# Patient Record
Sex: FEMALE | Race: WHITE
[De-identification: names, ages, dates, MRNs, and addresses within clinical notes are randomized per-mention and may not be internally consistent; named-entity substitution may affect disease eponyms.]

---

## 2019-08-25 ENCOUNTER — HOSPITAL ENCOUNTER (EMERGENCY)
Dept: HOSPITAL 62 - ER | Age: 49
Discharge: HOME | End: 2019-08-25
Payer: OTHER GOVERNMENT

## 2019-08-25 VITALS — DIASTOLIC BLOOD PRESSURE: 93 MMHG | SYSTOLIC BLOOD PRESSURE: 141 MMHG

## 2019-08-25 DIAGNOSIS — R10.13: ICD-10-CM

## 2019-08-25 DIAGNOSIS — R07.9: Primary | ICD-10-CM

## 2019-08-25 DIAGNOSIS — Z90.49: ICD-10-CM

## 2019-08-25 DIAGNOSIS — F17.200: ICD-10-CM

## 2019-08-25 LAB
ADD MANUAL DIFF: NO
ALBUMIN SERPL-MCNC: 4.3 G/DL (ref 3.5–5)
ALP SERPL-CCNC: 89 U/L (ref 38–126)
ANION GAP SERPL CALC-SCNC: 11 MMOL/L (ref 5–19)
APPEARANCE UR: CLEAR
APTT PPP: YELLOW S
AST SERPL-CCNC: 136 U/L (ref 14–36)
BASOPHILS # BLD AUTO: 0.1 10^3/UL (ref 0–0.2)
BASOPHILS NFR BLD AUTO: 0.7 % (ref 0–2)
BILIRUB DIRECT SERPL-MCNC: 0.3 MG/DL (ref 0–0.4)
BILIRUB SERPL-MCNC: 0.9 MG/DL (ref 0.2–1.3)
BILIRUB UR QL STRIP: NEGATIVE
BUN SERPL-MCNC: 6 MG/DL (ref 7–20)
CALCIUM: 8.7 MG/DL (ref 8.4–10.2)
CHLORIDE SERPL-SCNC: 105 MMOL/L (ref 98–107)
CK MB SERPL-MCNC: 0.53 NG/ML (ref ?–4.55)
CK SERPL-CCNC: 80 U/L (ref 30–135)
CO2 SERPL-SCNC: 23 MMOL/L (ref 22–30)
EOSINOPHIL # BLD AUTO: 0.1 10^3/UL (ref 0–0.6)
EOSINOPHIL NFR BLD AUTO: 1.1 % (ref 0–6)
ERYTHROCYTE [DISTWIDTH] IN BLOOD BY AUTOMATED COUNT: 14 % (ref 11.5–14)
GLUCOSE SERPL-MCNC: 89 MG/DL (ref 75–110)
GLUCOSE UR STRIP-MCNC: NEGATIVE MG/DL
HCT VFR BLD CALC: 43.3 % (ref 36–47)
HGB BLD-MCNC: 15 G/DL (ref 12–15.5)
KETONES UR STRIP-MCNC: 20 MG/DL
LYMPHOCYTES # BLD AUTO: 2.4 10^3/UL (ref 0.5–4.7)
LYMPHOCYTES NFR BLD AUTO: 22.8 % (ref 13–45)
MCH RBC QN AUTO: 32.8 PG (ref 27–33.4)
MCHC RBC AUTO-ENTMCNC: 34.7 G/DL (ref 32–36)
MCV RBC AUTO: 94 FL (ref 80–97)
MONOCYTES # BLD AUTO: 0.9 10^3/UL (ref 0.1–1.4)
MONOCYTES NFR BLD AUTO: 8.7 % (ref 3–13)
NEUTROPHILS # BLD AUTO: 7.2 10^3/UL (ref 1.7–8.2)
NEUTS SEG NFR BLD AUTO: 66.7 % (ref 42–78)
NITRITE UR QL STRIP: NEGATIVE
PH UR STRIP: 5 [PH] (ref 5–9)
PLATELET # BLD: 301 10^3/UL (ref 150–450)
POTASSIUM SERPL-SCNC: 3.9 MMOL/L (ref 3.6–5)
PROT SERPL-MCNC: 7.6 G/DL (ref 6.3–8.2)
PROT UR STRIP-MCNC: NEGATIVE MG/DL
RBC # BLD AUTO: 4.58 10^6/UL (ref 3.72–5.28)
SP GR UR STRIP: 1.02
TOTAL CELLS COUNTED % (AUTO): 100 %
TROPONIN I SERPL-MCNC: < 0.012 NG/ML
UROBILINOGEN UR-MCNC: NEGATIVE MG/DL (ref ?–2)
WBC # BLD AUTO: 10.8 10^3/UL (ref 4–10.5)

## 2019-08-25 PROCEDURE — 81001 URINALYSIS AUTO W/SCOPE: CPT

## 2019-08-25 PROCEDURE — 71275 CT ANGIOGRAPHY CHEST: CPT

## 2019-08-25 PROCEDURE — 74174 CTA ABD&PLVS W/CONTRAST: CPT

## 2019-08-25 PROCEDURE — 83690 ASSAY OF LIPASE: CPT

## 2019-08-25 PROCEDURE — 82553 CREATINE MB FRACTION: CPT

## 2019-08-25 PROCEDURE — 84484 ASSAY OF TROPONIN QUANT: CPT

## 2019-08-25 PROCEDURE — 96375 TX/PRO/DX INJ NEW DRUG ADDON: CPT

## 2019-08-25 PROCEDURE — 93010 ELECTROCARDIOGRAM REPORT: CPT

## 2019-08-25 PROCEDURE — 85025 COMPLETE CBC W/AUTO DIFF WBC: CPT

## 2019-08-25 PROCEDURE — 36415 COLL VENOUS BLD VENIPUNCTURE: CPT

## 2019-08-25 PROCEDURE — 71046 X-RAY EXAM CHEST 2 VIEWS: CPT

## 2019-08-25 PROCEDURE — 82550 ASSAY OF CK (CPK): CPT

## 2019-08-25 PROCEDURE — 96376 TX/PRO/DX INJ SAME DRUG ADON: CPT

## 2019-08-25 PROCEDURE — 99285 EMERGENCY DEPT VISIT HI MDM: CPT

## 2019-08-25 PROCEDURE — 93005 ELECTROCARDIOGRAM TRACING: CPT

## 2019-08-25 PROCEDURE — 80053 COMPREHEN METABOLIC PANEL: CPT

## 2019-08-25 PROCEDURE — 96374 THER/PROPH/DIAG INJ IV PUSH: CPT

## 2019-08-25 RX ADMIN — NITROGLYCERIN PRN TAB: 0.4 TABLET SUBLINGUAL at 19:15

## 2019-08-25 RX ADMIN — NITROGLYCERIN PRN TAB: 0.4 TABLET SUBLINGUAL at 19:25

## 2019-08-25 NOTE — RADIOLOGY REPORT (SQ)
EXAM DESCRIPTION:  CHEST 2 VIEWS



COMPLETED DATE/TIME:  8/25/2019 5:44 pm



REASON FOR STUDY:  chest pain



COMPARISON:  None.



EXAM PARAMETERS:  NUMBER OF VIEWS: two views

TECHNIQUE: Digital Frontal and Lateral radiographic views of the chest acquired.

RADIATION DOSE: NA

LIMITATIONS: none



FINDINGS:  LUNGS AND PLEURA: Calcified granuloma right mid lung field.  No acute infiltrates or effus
ions.

MEDIASTINUM AND HILAR STRUCTURES: No masses or contour abnormalities.

HEART AND VASCULAR STRUCTURES: The heart pulmonary vasculature are normal.

BONES: No acute findings.

HARDWARE: None in the chest.

OTHER: No other significant finding.



IMPRESSION:  No acute disease.  Old granulomatous disease.



TECHNICAL DOCUMENTATION:  JOB ID:  [id number]

[id number]

 2011 [id number]- All Rights Reserved



Reading location - IP/workstation name: [email or web address]

## 2019-08-25 NOTE — RADIOLOGY REPORT (SQ)
EXAM DESCRIPTION: 



CT ABDOMEN PELVIS WITHOUT THEN WITH IV CONTRAST, CT CHEST

ANGIOGRAPHY WITHOUT THEN WITH IV CONTRAST



COMPLETED DATE/TME:  08/25/2019 18:55



CLINICAL HISTORY:  49 years  Female  chest pain and abdominal

pain



COMPARISON:  None.



TECHNIQUE: Contiguous axial images obtained through the chest

abdomen and pelvis during the infusion of IV contrast.

Reformatted images obtained. 3-D MIP reformatted images obtained.

 NASCET criteria utilized for the evaluation of any stenotic

lesions.



This exam was performed according to our department optimization

program which includes automated exposure control, adjustment of

the mA and/or kv according to patient size and/or use of

iterative reconstruction technique.



FINDINGS:



Chest: Bilateral breast implants.

Aorta is normal in caliber without dissection or rupture.

There is no evidence of pericardial or pleural effusion.

Calcified lymph nodes in the right hilum.

No significant mediastinal or hilar adenopathy.

No evidence of pulmonary embolus. No acute infiltrate. No

pneumothorax.



Abdomen pelvis: Fatty infiltration of the liver. Liver is at the

upper limits of normal in size.

Unremarkable adrenal glands, pancreas and spleen.

Absent gallbladder. Mild extrahepatic ductal dilatation.



Unremarkable appendix.

Kidneys are within normal limits. No free fluid in the pelvis. No

bowel obstruction.



The abdominal aorta is normal in caliber without dissection or

rupture.

The origins of the celiac axis, SMA and renal arteries are

unremarkable.

Common and external iliac arteries also appear patent.



IMPRESSION: No evidence of thoracic or abdominal aortic aneurysm

or dissection



No evidence of pulmonary embolus



Fatty infiltration of the liver



No evidence of acute process

## 2019-08-25 NOTE — ER DOCUMENT REPORT
ED General





- General


Chief Complaint: Chest Pain


Stated Complaint: CHEST PAIN


Time Seen by Provider: 08/25/19 18:32


Primary Care Provider: 


SHIN POMPA MD [ACTIVE STAFF] - Follow up in 3-5 days


(cardiology


)


TARAS HAIRSTON MD [ACTIVE STAFF] - Follow up in 3-5 days (gastroenterology)


[facility] [Primary Care Provider] - Follow up in 3-5 days


Notes: 





Patient is a 49-year-old female that presents to the emergency department for 

chief complaint of chest pain and abdominal pain.  Patient states that this pain

started a few hours ago, has been coming and going, describes it as a heaviness 

and pressure across her chest, she currently rates as a 9 out of 10.  She states

he will come and go without any aggravating, relieving factors.  She has not 

noticed that exertion made it worse or better, did not seem to be better or 

worse with food.  The pain is on the left side of her chest, and her epigastric 

region of her abdomen.  She is concerned that this may be her heart, her brother

had heart disease in his 40s.  She does admit to smoking cigarettes, drinking 3-

4 times a week, denies prior history of other chronic medical conditions.  She 

said last time she had pain like this, it turned out that she had gallbladder 

disease and had to have a cholecystectomy.





Past Medical History: Denies chronic medical conditions


Past Surgical History: Cholecystectomy


Social History: Admits to smoking cigarettes daily, and drinking alcohol 3-4 

times a week, denies illicit drug use.


Family History: Reviewed and noncontributory for presenting illness


Allergies: Reviewed, see documented allergy list. 





REVIEW OF SYSTEMS:


Other than noted above, the 12 point review of systems was reviewed with the 

patient and were negative, all pertinent findings are included in the HPI.





PHYSICAL EXAMINATION:





Vital signs reviewed, nursing noted reviewed. 





GENERAL: Patient appears rather anxious on exam.





HEAD: Atraumatic, normocephalic.





EYES: Eyes appear normal, extraocular movements intact, sclera anicteric, 

conjunctiva are normal.  PERRLA





ENT: nares patent, oropharynx clear without exudates.  Moist mucous membranes.





NECK: Normal range of motion, supple without lymphadenopathy





LUNGS: Breath sounds clear to auscultation bilaterally and equal.  No wheezes 

rales or rhonchi.





HEART: Regular rate and rhythm without murmurs





ABDOMEN: Soft, nontender, normoactive bowel sounds.  No rebound, guarding, or 

rigidity. No masses appreciated.





EXTREMITIES: Nontender, good range of motion, no pitting or edema.  





NEUROLOGICAL: No focal neurological deficits. Moves all extremities 

spontaneously Motor and sensory grossly intact on exam.





PSYCH: Appears anxious on exam, some pressured speech, but answering questions 

appropriately.





SKIN: Warm, Dry, normal turgor, no rashes or lesions noted on exposed skin








- Related Data


Allergies/Adverse Reactions: 


                                        





No Known Allergies Allergy (Unverified 08/25/19 21:54)


   











Past Medical History





- Social History


Smoking Status: Current Some Day Smoker


Frequency of alcohol use: Occasional


Drug Abuse: None


Family History: Reviewed & Not Pertinent


Patient has suicidal ideation: No


Patient has homicidal ideation: No


Renal/ Medical History: Denies: Hx Peritoneal Dialysis


Musculoskeletal Medical History: Reports Hx Arthritis - RA


Past Surgical History: Reports: Hx Orthopedic Surgery - Right rotator cuff





Physical Exam





- Vital signs


Vitals: 


                                        











Temp Pulse BP Pulse Ox


 


 98.2 F   93   131/92 H  96 


 


 08/25/19 16:49  08/25/19 16:49  08/25/19 16:49  08/25/19 16:49














Course





- Re-evaluation


Re-evalutation: 


Patient seen and examined vital signs reviewed. 





Laboratory data and/or imaging were ordered as appropriate for the patient's 

presenting symptoms and complaint, with consideration of any critical or life 

threatening conditions that may be associated with their obtained history and 

exam as noted above.





Patient was treated with aspirin as ordered in triage, and given IV morphine and

Zofran, and was still having some pain reevaluation, and was given some IV 

Dilaudid, due to the patient's pain above and below the diaphragm, and smoking 

history, CT angiogram of the chest abdomen and pelvis was ordered to rule out 

aortic dissection.  Her blood work was reviewed, demonstrated negative troponin,

and was otherwise unremarkable, EKG did not demonstrate acute ischemia.





Repeat troponin was ordered.  And came back as negative.  From a cardiac 

standpoint, patient's heart score is less than 3, and I feel she can be 

discharged at this point, and follow-up for stress testing, given family 

history, but she otherwise does not have significant risk factors, and again her

heart score is less than 3.





The patient was re-evaluated and was improved, did give her a GI cocktail, as 

etiology of this was most likely GI, she is been on PPIs in the past, but has 

not been on them in a while, she also has ulcerative colitis, has not been 

taking medication for that.  Although her CT imaging was otherwise negative and 

did not show diffuse colitis.  I feel she likely has gastritis versus peptic 

ulcer disease, without anemia or suspicion of bleeding.  We will discharge her 

home with prescription for proton pump inhibitor, with Protonix, as well as 

Carafate and advised follow-up with gastroenterology, patient was agreeable to 

this plan of care and will be discharged home.





Results were discussed with the patient at this point, after careful cons

ideration I feel that that patient can be discharged from the emergency 

department, the patient was educated treatments and reasons to return to the 

emergency department based on their presumed diagnosis as noted above, they were

advised to followup with a primary care physician in 2-3 days. Patient was 

agreeable to plan of care.





*Note is created using voice recognition software and may contain spelling, 

syntax or grammatical errors.








Laboratory











  08/25/19 08/25/19 08/25/19





  18:00 18:00 18:00


 


WBC  10.8 H  


 


RBC  4.58  


 


Hgb  15.0  


 


Hct  43.3  


 


MCV  94  


 


MCH  32.8  


 


MCHC  34.7  


 


RDW  14.0  


 


Plt Count  301  


 


Lymph % (Auto)  22.8  


 


Mono % (Auto)  8.7  


 


Eos % (Auto)  1.1  


 


Baso % (Auto)  0.7  


 


Absolute Neuts (auto)  7.2  


 


Absolute Lymphs (auto)  2.4  


 


Absolute Monos (auto)  0.9  


 


Absolute Eos (auto)  0.1  


 


Absolute Basos (auto)  0.1  


 


Seg Neutrophils %  66.7  


 


Sodium   138.8 


 


Potassium   3.9 


 


Chloride   105 


 


Carbon Dioxide   23 


 


Anion Gap   11 


 


BUN   6 L 


 


Creatinine   0.71 


 


Est GFR ( Amer)   > 60 


 


Est GFR (MDRD) Non-Af   > 60 


 


Glucose   89 


 


Calcium   8.7 


 


Total Bilirubin   0.9 


 


Direct Bilirubin   0.3 


 


Neonat Total Bilirubin   Not Reportable 


 


Neonat Direct Bilirubin   Not Reportable 


 


Neonat Indirect Bili   Not Reportable 


 


AST   136 H 


 


ALT   132 


 


Alkaline Phosphatase   89 


 


Creatine Kinase   80 


 


CK-MB (CK-2)    0.53


 


Troponin I    < 0.012


 


Total Protein   7.6 


 


Albumin   4.3 


 


Lipase   70.5 


 


Urine Color   


 


Urine Appearance   


 


Urine pH   


 


Ur Specific Gravity   


 


Urine Protein   


 


Urine Glucose (UA)   


 


Urine Ketones   


 


Urine Blood   


 


Urine Nitrite   


 


Urine Bilirubin   


 


Urine Urobilinogen   


 


Ur Leukocyte Esterase   


 


Urine WBC (Auto)   


 


Urine RBC (Auto)   


 


U Hyaline Cast (Auto)   


 


Urine Bacteria (Auto)   


 


Squamous Epi Cells Auto   


 


Urine Mucus (Auto)   


 


Urine Ascorbic Acid   














  08/25/19 08/25/19





  19:07 20:07


 


WBC  


 


RBC  


 


Hgb  


 


Hct  


 


MCV  


 


MCH  


 


MCHC  


 


RDW  


 


Plt Count  


 


Lymph % (Auto)  


 


Mono % (Auto)  


 


Eos % (Auto)  


 


Baso % (Auto)  


 


Absolute Neuts (auto)  


 


Absolute Lymphs (auto)  


 


Absolute Monos (auto)  


 


Absolute Eos (auto)  


 


Absolute Basos (auto)  


 


Seg Neutrophils %  


 


Sodium  


 


Potassium  


 


Chloride  


 


Carbon Dioxide  


 


Anion Gap  


 


BUN  


 


Creatinine  


 


Est GFR ( Amer)  


 


Est GFR (MDRD) Non-Af  


 


Glucose  


 


Calcium  


 


Total Bilirubin  


 


Direct Bilirubin  


 


Neonat Total Bilirubin  


 


Neonat Direct Bilirubin  


 


Neonat Indirect Bili  


 


AST  


 


ALT  


 


Alkaline Phosphatase  


 


Creatine Kinase  


 


CK-MB (CK-2)  


 


Troponin I   < 0.012


 


Total Protein  


 


Albumin  


 


Lipase  


 


Urine Color  YELLOW 


 


Urine Appearance  CLEAR 


 


Urine pH  5.0 


 


Ur Specific Gravity  1.019 


 


Urine Protein  NEGATIVE 


 


Urine Glucose (UA)  NEGATIVE 


 


Urine Ketones  20 H 


 


Urine Blood  SMALL H 


 


Urine Nitrite  NEGATIVE 


 


Urine Bilirubin  NEGATIVE 


 


Urine Urobilinogen  NEGATIVE 


 


Ur Leukocyte Esterase  NEGATIVE 


 


Urine WBC (Auto)  6 


 


Urine RBC (Auto)  0 


 


U Hyaline Cast (Auto)  1 


 


Urine Bacteria (Auto)  TRACE 


 


Squamous Epi Cells Auto  1 


 


Urine Mucus (Auto)  OCC 


 


Urine Ascorbic Acid  NEGATIVE 











                                        





Chest X-Ray  08/25/19 16:53


IMPRESSION:  No acute disease.  Old granulomatous disease.


 








Abdomen/Pelvis CTA  08/25/19 18:55


IMPRESSION: No evidence of thoracic or abdominal aortic aneurysm


or dissection


 


No evidence of pulmonary embolus


 


Fatty infiltration of the liver


 


No evidence of acute process


 


 


 








Chest/Abdomen CTA  08/25/19 18:55


IMPRESSION: No evidence of thoracic or abdominal aortic aneurysm


or dissection


 


No evidence of pulmonary embolus


 


Fatty infiltration of the liver


 


No evidence of acute process


 


 


 

















- Vital Signs


Vital signs: 


                                        











Temp Pulse Resp BP Pulse Ox


 


 98.2 F   93   14   129/97 H  92 


 


 08/25/19 19:30  08/25/19 16:49  08/25/19 19:30  08/25/19 19:30  08/25/19 19:30














- Laboratory


Result Diagrams: 


                                 08/25/19 18:00





                                 08/25/19 18:00


Laboratory results interpreted by me: 


                                        











  08/25/19 08/25/19 08/25/19





  18:00 18:00 19:07


 


WBC  10.8 H  


 


BUN   6 L 


 


AST   136 H 


 


Urine Ketones    20 H


 


Urine Blood    SMALL H














- EKG Interpretation by Me


Additional EKG results interpreted by me: 





EKG demonstrates sinus rhythm with a ventricular rate of 87 bpm, normal axis, 

normal intervals, no evidence of acute ischemia in this EKG, but no prior for 

comparison.








Discharge





- Discharge


Clinical Impression: 


Abdominal pain


Qualifiers:


 Abdominal location: epigastric Qualified Code(s): R10.13 - Epigastric pain





Chest pain


Qualifiers:


 Chest pain type: unspecified Qualified Code(s): R07.9 - Chest pain, unspecified





Condition: Stable


Disposition: HOME, SELF-CARE


Instructions:  Abdominal Pain ([facility]), Chest Pain of Unclear Cause ([facility])


Additional Instructions: 


Please follow-up with a gastroenterologist, I also recommend given your family 

history to follow-up with a cardiologist, they have been listed with your 

discharge paperwork, call to make an appointment.  Recommend you take the 

prescribed omeprazole, once daily for the next month to see if it will help with

some of your symptoms and prevent them from returning.  If possible try to quit 

smoking, and cut back on alcohol intake, as these things can worsen ulcer 

disease, if that is what is causing your pain.


Prescriptions: 


Sucralfate [Carafate 1 gm Tablet] 1 gm PO ACHS #120 tablet


Pantoprazole Sodium [Protonix 40 mg Dr Tablet] 40 mg PO DAILY #30 tablet.


Referrals: 


[facility] [[facility]] - Follow up in 3-5 days


SHIN POMPA MD [ACTIVE STAFF] - Follow up in 3-5 days


(cardiology


)


TARAS HAIRSTON MD [ACTIVE STAFF] - Follow up in 3-5 days (gastroenterology)

## 2019-12-17 ENCOUNTER — HOSPITAL ENCOUNTER (EMERGENCY)
Dept: HOSPITAL 62 - ER | Age: 49
Discharge: HOME | End: 2019-12-17
Payer: OTHER GOVERNMENT

## 2019-12-17 VITALS — DIASTOLIC BLOOD PRESSURE: 80 MMHG | SYSTOLIC BLOOD PRESSURE: 122 MMHG

## 2019-12-17 DIAGNOSIS — F43.10: ICD-10-CM

## 2019-12-17 DIAGNOSIS — F10.920: ICD-10-CM

## 2019-12-17 DIAGNOSIS — F33.1: Primary | ICD-10-CM

## 2019-12-17 DIAGNOSIS — F17.200: ICD-10-CM

## 2019-12-17 DIAGNOSIS — R51: ICD-10-CM

## 2019-12-17 LAB
ADD MANUAL DIFF: NO
ALBUMIN SERPL-MCNC: 4.5 G/DL (ref 3.5–5)
ALP SERPL-CCNC: 63 U/L (ref 38–126)
ANION GAP SERPL CALC-SCNC: 15 MMOL/L (ref 5–19)
APAP SERPL-MCNC: < 10 UG/ML (ref 10–30)
APPEARANCE UR: (no result)
APTT PPP: (no result) S
AST SERPL-CCNC: 38 U/L (ref 14–36)
BARBITURATES UR QL SCN: NEGATIVE
BASOPHILS # BLD AUTO: 0.1 10^3/UL (ref 0–0.2)
BASOPHILS NFR BLD AUTO: 1.2 % (ref 0–2)
BILIRUB DIRECT SERPL-MCNC: 0.2 MG/DL (ref 0–0.4)
BILIRUB SERPL-MCNC: 0.4 MG/DL (ref 0.2–1.3)
BILIRUB UR QL STRIP: NEGATIVE
BUN SERPL-MCNC: 9 MG/DL (ref 7–20)
CALCIUM: 9.1 MG/DL (ref 8.4–10.2)
CHLORIDE SERPL-SCNC: 110 MMOL/L (ref 98–107)
CO2 SERPL-SCNC: 23 MMOL/L (ref 22–30)
EOSINOPHIL # BLD AUTO: 0.2 10^3/UL (ref 0–0.6)
EOSINOPHIL NFR BLD AUTO: 2.5 % (ref 0–6)
ERYTHROCYTE [DISTWIDTH] IN BLOOD BY AUTOMATED COUNT: 13.6 % (ref 11.5–14)
ETHANOL SERPL-MCNC: 234 MG/DL
GLUCOSE SERPL-MCNC: 95 MG/DL (ref 75–110)
GLUCOSE UR STRIP-MCNC: NEGATIVE MG/DL
HCT VFR BLD CALC: 43.8 % (ref 36–47)
HGB BLD-MCNC: 15 G/DL (ref 12–15.5)
KETONES UR STRIP-MCNC: NEGATIVE MG/DL
LYMPHOCYTES # BLD AUTO: 2.8 10^3/UL (ref 0.5–4.7)
LYMPHOCYTES NFR BLD AUTO: 41.3 % (ref 13–45)
MCH RBC QN AUTO: 32.4 PG (ref 27–33.4)
MCHC RBC AUTO-ENTMCNC: 34.2 G/DL (ref 32–36)
MCV RBC AUTO: 95 FL (ref 80–97)
METHADONE UR QL SCN: NEGATIVE
MONOCYTES # BLD AUTO: 0.5 10^3/UL (ref 0.1–1.4)
MONOCYTES NFR BLD AUTO: 7.4 % (ref 3–13)
NEUTROPHILS # BLD AUTO: 3.2 10^3/UL (ref 1.7–8.2)
NEUTS SEG NFR BLD AUTO: 47.6 % (ref 42–78)
NITRITE UR QL STRIP: NEGATIVE
PCP UR QL SCN: NEGATIVE
PH UR STRIP: 6 [PH] (ref 5–9)
PLATELET # BLD: 333 10^3/UL (ref 150–450)
POTASSIUM SERPL-SCNC: 4.7 MMOL/L (ref 3.6–5)
PROT SERPL-MCNC: 8.2 G/DL (ref 6.3–8.2)
PROT UR STRIP-MCNC: NEGATIVE MG/DL
RBC # BLD AUTO: 4.62 10^6/UL (ref 3.72–5.28)
SALICYLATES SERPL-MCNC: < 1 MG/DL (ref 2–20)
SP GR UR STRIP: 1
TOTAL CELLS COUNTED % (AUTO): 100 %
URINE AMPHETAMINES SCREEN: NEGATIVE
URINE BENZODIAZEPINES SCREEN: NEGATIVE
URINE COCAINE SCREEN: NEGATIVE
URINE MARIJUANA (THC) SCREEN: NEGATIVE
UROBILINOGEN UR-MCNC: NEGATIVE MG/DL (ref ?–2)
WBC # BLD AUTO: 6.7 10^3/UL (ref 4–10.5)

## 2019-12-17 PROCEDURE — 93010 ELECTROCARDIOGRAM REPORT: CPT

## 2019-12-17 PROCEDURE — 70450 CT HEAD/BRAIN W/O DYE: CPT

## 2019-12-17 PROCEDURE — 36415 COLL VENOUS BLD VENIPUNCTURE: CPT

## 2019-12-17 PROCEDURE — 99285 EMERGENCY DEPT VISIT HI MDM: CPT

## 2019-12-17 PROCEDURE — 80307 DRUG TEST PRSMV CHEM ANLYZR: CPT

## 2019-12-17 PROCEDURE — 85025 COMPLETE CBC W/AUTO DIFF WBC: CPT

## 2019-12-17 PROCEDURE — 93005 ELECTROCARDIOGRAM TRACING: CPT

## 2019-12-17 PROCEDURE — 80053 COMPREHEN METABOLIC PANEL: CPT

## 2019-12-17 PROCEDURE — 81001 URINALYSIS AUTO W/SCOPE: CPT

## 2019-12-17 PROCEDURE — 96372 THER/PROPH/DIAG INJ SC/IM: CPT

## 2019-12-17 NOTE — ER DOCUMENT REPORT
ED General





- General


Chief Complaint: Psych Problem


Stated Complaint: ABNORMAL BEHAVIOR,ETOH


Time Seen by Provider: 12/17/19 06:16


Primary Care Provider: 


[facility] [Primary Care [facility]] - Follow up as needed


Information source: Patient


TRAVEL OUTSIDE OF THE [place] IN LAST 30 DAYS: No





- HPI


Notes: 





Patient presents with depression.  She states she is been feeling depressed and 

having some thoughts of hurting herself.  She cannot state any specific plan.  

Says she does have previous problems with depression and suicide attempts.  She 

states that currently she does not have any active thoughts of suicide.  Denies 

any thoughts when hurting really else.  She states she does not have any 

problems with auditory or visual hallucinations.  No vomiting or diarrhea.  She 

states she has had a left-sided headache that is been going on for approximate 1

week.  Nothing makes it better or worse.  It is constant and throbbing.  Is 

moderate in intensity.  It radiates across left side of her head.  She denies 

any previous history of problems with headaches.





- Related Data


Allergies/Adverse Reactions: 


                                        





No Known Allergies Allergy (Verified 12/17/19 05:42)


   








Home Medications: Pt reports she is not currently taking medications





Past Medical History





- General


Information source: Patient





- Social History


Smoking Status: Current Some Day Smoker


Chew tobacco use (# tins/day): No


Frequency of alcohol use: 3days/wk


Drug Abuse: None


Family History: Reviewed & Not Pertinent


Patient has suicidal ideation: Yes - Pt denies, but reports previous plan of 

running out into traffic


Patient has homicidal ideation: No


Renal/ Medical History: Denies: Hx Peritoneal Dialysis


Musculoskeletal Medical History: Reports Hx Arthritis - RA


Past Surgical History: Reports: Hx Orthopedic Surgery - Right rotator cuff





Review of Systems





- Review of Systems


Constitutional: denies: Chills, Fever


Cardiovascular: denies: Chest pain, Palpitations


Respiratory: denies: Cough, Short of breath


Gastrointestinal: denies: Diarrhea, Vomiting


-: Yes All other systems reviewed and negative





Physical Exam





- Vital signs


Vitals: 


                                        











Temp Pulse Resp BP Pulse Ox


 


 98.6 F   111 H  18   141/99 H  96 


 


 12/17/19 05:40  12/17/19 05:40  12/17/19 05:40  12/17/19 05:40  12/17/19 05:40











Interpretation: Normal





- General


General appearance: Appears well, Alert





- HEENT


Head: Normocephalic, Atraumatic


Eyes: Normal


Pupils: PERRL





- Respiratory


Respiratory status: No respiratory distress


Chest status: Nontender


Breath sounds: Normal


Chest palpation: Normal





- Cardiovascular


Rhythm: Regular


Heart sounds: Normal auscultation


Murmur: No





- Abdominal


Inspection: Normal


Distension: No distension


Bowel sounds: Normal


Tenderness: Nontender


Organomegaly: No organomegaly





- Back


Back: Normal, Nontender





- Extremities


General upper extremity: Normal inspection, Nontender, Normal color, Normal ROM,

Normal temperature


General lower extremity: Normal inspection, Nontender, Normal color, Normal ROM,

Normal temperature, Normal weight bearing.  No: Michaela's sign





- Neurological


Neuro grossly intact: Yes


Cognition: Normal


Orientation: AAOx4


Macario Coma Scale Eye Opening: Spontaneous


Macario Coma Scale Verbal: Oriented


Macario Coma Scale Motor: Obeys Commands


Macario Coma Scale Total: 15


Speech: Normal


Motor strength normal: LUE, RUE, LLE, RLE


Sensory: Normal





- Psychological


Associated symptoms: Depressed, Flat affect





- Skin


Skin Temperature: Warm


Skin Moisture: Dry


Skin Color: Normal





Course





- Re-evaluation


Re-evalutation: 





12/17/19 12:50


Patient arrives with complaints of depression and is obviously intoxicated.  

However she ambulates without problem and speaks very clearly.  She has been 

very difficult to deal with.  She has no criteria for IVC.  Her laboratory work-

up is unremarkable other than an elevated alcohol level.  Psychiatry has spent 

extensive time with the patient and patient is currently not agreeable to 

alcohol inpatient treatment except at the [facility].  At this time the 

[facility] does not have any beds for the patient.  Therefore patient 

will be discharged to follow-up as an outpatient.  I see no evidence that the 

patient is a threat to herself or others at this time.  Her vital signs are 

stable.  She does have her fianc at the bedside who can accompany the patient.





- Vital Signs


Vital signs: 


                                        











Temp Pulse Resp BP Pulse Ox


 


 98.6 F   111 H  18   141/99 H  96 


 


 12/17/19 05:40  12/17/19 05:40  12/17/19 05:40  12/17/19 05:40  12/17/19 05:40














- Laboratory


Result Diagrams: 


                                 12/17/19 07:23





                                 12/17/19 07:23


Laboratory results interpreted by me: 


                                        











  12/17/19 12/17/19





  07:23 10:19


 


Sodium  147.8 H 


 


Chloride  110 H 


 


AST  38 H 


 


Ur Leukocyte Esterase   TRACE H


 


Salicylates  < 1.0 L 


 


Acetaminophen  < 10 L 














- EKG Interpretation by Me


EKG shows normal: Sinus rhythm


Rate: Normal - 97


Rhythm: NSR


Axis/QRS: No: Right axis deviation, Left axis deviation





Discharge





- Discharge


Clinical Impression: 


Depression


Qualifiers:


 Depression Type: major depressive disorder Major depression recurrence: 

recurrent Active/Remission status: currently active Major depression episode 

severity: moderate Qualified Code(s): F33.1 - Major depressive disorder, 

recurrent, moderate





Alcohol intoxication


Qualifiers:


 Complication of substance-induced condition: uncomplicated Qualified Code(s): 

F10.920 - Alcohol use, unspecified with intoxication, uncomplicated





Condition: Stable


Disposition: HOME, SELF-CARE


Instructions:  Acute Alcohol Intoxication ([facility]), Chronic Alcoholism ([facility]), 

Depression ([facility])


Additional Instructions: 


Please seek out help with your alcohol as soon as possible.  Please consider 

using the resources given you here in the emergency department.


Prescriptions: 


Chlordiazepoxide HCl [Librium 25 mg Capsule] 1 cap PO QID #30 capsule


Referrals: 


[facility] [Primary Care Provider] - Follow up tomorrow

## 2019-12-17 NOTE — PSYCHOLOGICAL NOTE
Psych Note





- Psych Note


Date seen by psych provider: 12/17/19


Time seen by psych provider: 11:00


Psych Note: 





Reason for consult: Behavioral Problems 





Patient is 49 year old female  with a history of PTSD and MST. Patient 

presents to ED via EMS. 





Patient was yelling at boyfriend about going outside to smoke and leaving 

patient alone when clinician entered the room. Patient is hyperfocused on her 

anxiety and the lack of medication she is receiving to alleviate her anxiety. 

Patient also complains of pain from her hernia. Patient repeatedly complained of

the lack of attention from medical staff. Clinician attempted to refocus 

patient's attention to her reported mental health concerns. Patient stated she 

[facility]. Patient again 

became irate complaining about her anxiety and lack of attention from medical 

staff. Patient was guarded with all disclosures when she was not complaining 

about her perceived lack of attention from medical staff. Patient denies 

suicidal and homicidal ideations. Patient denies auditory and visual 

hallucinations. 





Clinician provided psychoeducation to patient regarding a high blood alcohol 

content and likelihood of accidental overdose/death when mixed with benzos. [place] [facility]. 





Arabella confirmed PTSD and MST. Patient has recieved inpatient services [facility]

the [facility] Clinician contacted [facility] for possible placement. There are no 

available beds at the [facility]. Patient declined to go to the [facility] in [place] to 

begin the process to have the [facility] refer her to [facility] for 

treatment. Patient stated she will only receive services through the [facility]. 





Updated: Patient asked to speak with clinician at discharge. Patient asked 

clinician about filling prescription through the [facility]. Clinician provided 

requested information. Patient expressed confusion about services. Clinician 

provided patient with outpatient mental health resource list with opinions 

highlighted that had experience with issues related to the  population 

([facility] and [facility]). Clinician provided patient with substance abuse 

treatment resource list. Clinician provided patient with the contact information

for the [facility] coordinator and a contact at the [facility] who specializes in PTSD and 

MST. Clinician encouraged patient to go to the [facility] ED. Patient 

expressed gratitude for the information. Patient spoke of being overwhelmed with

navigating the [facility] system. Clinician encouraged patient to always follow up with 

the [facility]. 





Patient is alert and oriented to person, place, time and circumstance. Mood is 

irate with congruent affect. Patient denies suicidal and homicidal ideations. 

Delusions are absent and behavior is congruent with an intact reality based 

presentation (i.e.: organized and linear through processes). There is no 

observed behavior that suggests patient is responding to internal stimuli. 

Patient denies current auditory and visual hallucinations. Eye contact is 

appropriate. Conversational speech is somewhat slurred. At times patient would 

pause before answering questions. Intellectual ability appears to be within 

average range. Attention and concentration are good. Insight, judgment and im

pulse control are currently poor.





DSM Diagnosis:


PTSD


MST


Alcohol Use Disorder 





Medication recommendations per [facility] contracted psychiatrist Dr. Sara ARTHUR is

as follows:


NONE





Impression/Plan: Patient is cleared from acute psychiatric services. Patient 

does not meet IVC criteria per [place] GS 122C. Patient denies suicidal and homicidal

ideations. There is no observed behavior that suggests patient is responding to 

internal stimuli. Patient denies current auditory and visual hallucinations. 

Patient lacks insight into her current circumstance. Clinician observed a 

pattern of aggressive behavior and speech when patient interacted with hospital 

staff, however patient could also be calm and cooperative. Initially patient 

declined any treatment that was not through the [facility]. Patient agreed to consider 

outpatient services at discharge. Patient was provided outpatient mental health 

resource list with  friendly options highlighted. Patient was provided 

with substance abuse treatment list. Plan is for patient to present to the 

[facility] ED. Dr. Blakely was consulted on the care and management of this 

patient; attending physician is in agreement with recommendations and arianne kelsey

## 2019-12-17 NOTE — RADIOLOGY REPORT (SQ)
EXAM DESCRIPTION:  CT HEAD WITHOUT



COMPLETED DATE/TIME:  12/17/2019 7:18 am



REASON FOR STUDY:  headache



COMPARISON:  None.



TECHNIQUE:  Axial images acquired through the brain without intravenous contrast.  Images reviewed wi
th bone, brain and subdural windows.  Images stored on PACS.

All CT scanners at this facility use dose modulation, iterative reconstruction, and/or weight based d
osing when appropriate to reduce radiation dose to as low as reasonably achievable (ALARA).

[facility]: Dose Right  [facility]: CareDose    [facility]: Dose Right    [facility]: Teradose 4D    [facility]: Smart Technologies



RADIATION DOSE:  CT Rad equipment meets quality standard of care and radiation dose reduction techniq
ues were employed. CTDIvol: 55.2 mGy. DLP: 1112 mGy-cm. mGy.



LIMITATIONS:  None.



FINDINGS:  VENTRICLES: Normal size and contour.

CEREBRUM: No masses.  No hemorrhage.  No midline shift.  No evidence for acute infarction. Normal gra
y/white matter differentiation. No areas of low density in the white matter.

CEREBELLUM: No masses.  No hemorrhage.  No alteration of density.  No evidence for acute infarction.

EXTRAAXIAL SPACES: No fluid collections.  No masses.

ORBITS AND GLOBE: No intra- or extraconal masses.  Normal contour of globe without masses.

CALVARIUM: No fracture.

PARANASAL SINUSES: No fluid or mucosal thickening.

SOFT TISSUES: No mass or hematoma.

OTHER: No other significant finding.



IMPRESSION:  No acute intracranial pathology.  No noncontrast CT findings to explain headache.

EVIDENCE OF ACUTE STROKE: NO.



COMMENT:  Quality ID # 436: Final reports with documentation of one or more dose reduction techniques
 (e.g., Automated exposure control, adjustment of the mA and/or kV according to patient size, use of 
iterative reconstruction technique)



TECHNICAL DOCUMENTATION:  JOB ID:  [id number]

 2011 [employer]â„¢- All Rights Reserved



Reading location - IP/workstation name: [email or web address]

## 2019-12-22 ENCOUNTER — HOSPITAL ENCOUNTER (EMERGENCY)
Dept: HOSPITAL 62 - ER | Age: 49
Discharge: HOME | End: 2019-12-22
Payer: OTHER GOVERNMENT

## 2019-12-22 VITALS — SYSTOLIC BLOOD PRESSURE: 119 MMHG | DIASTOLIC BLOOD PRESSURE: 88 MMHG

## 2019-12-22 DIAGNOSIS — F10.129: Primary | ICD-10-CM

## 2019-12-22 DIAGNOSIS — F41.9: ICD-10-CM
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
## 2019-12-22 DIAGNOSIS — R10.13: ICD-10-CM

## 2019-12-22 DIAGNOSIS — F17.210: ICD-10-CM

## 2019-12-22 LAB
ADD MANUAL DIFF: NO
ALBUMIN SERPL-MCNC: 4.6 G/DL (ref 3.5–5)
ALP SERPL-CCNC: 68 U/L (ref 38–126)
ANION GAP SERPL CALC-SCNC: 15 MMOL/L (ref 5–19)
AST SERPL-CCNC: 55 U/L (ref 14–36)
BARBITURATES UR QL SCN: NEGATIVE
BASOPHILS # BLD AUTO: 0.1 10^3/UL (ref 0–0.2)
BASOPHILS NFR BLD AUTO: 1 % (ref 0–2)
BILIRUB DIRECT SERPL-MCNC: 0.2 MG/DL (ref 0–0.4)
BILIRUB SERPL-MCNC: 0.3 MG/DL (ref 0.2–1.3)
BUN SERPL-MCNC: 11 MG/DL (ref 7–20)
CALCIUM: 9.4 MG/DL (ref 8.4–10.2)
CHLORIDE SERPL-SCNC: 113 MMOL/L (ref 98–107)
CO2 SERPL-SCNC: 20 MMOL/L (ref 22–30)
EOSINOPHIL # BLD AUTO: 0.3 10^3/UL (ref 0–0.6)
EOSINOPHIL NFR BLD AUTO: 4.6 % (ref 0–6)
ERYTHROCYTE [DISTWIDTH] IN BLOOD BY AUTOMATED COUNT: 13.2 % (ref 11.5–14)
ETHANOL SERPL-MCNC: 263 MG/DL
GLUCOSE SERPL-MCNC: 99 MG/DL (ref 75–110)
HCT VFR BLD CALC: 44.9 % (ref 36–47)
HGB BLD-MCNC: 15.2 G/DL (ref 12–15.5)
LYMPHOCYTES # BLD AUTO: 3.1 10^3/UL (ref 0.5–4.7)
LYMPHOCYTES NFR BLD AUTO: 43.6 % (ref 13–45)
MCH RBC QN AUTO: 32.3 PG (ref 27–33.4)
MCHC RBC AUTO-ENTMCNC: 33.9 G/DL (ref 32–36)
MCV RBC AUTO: 95 FL (ref 80–97)
METHADONE UR QL SCN: NEGATIVE
MONOCYTES # BLD AUTO: 0.5 10^3/UL (ref 0.1–1.4)
MONOCYTES NFR BLD AUTO: 6.5 % (ref 3–13)
NEUTROPHILS # BLD AUTO: 3.1 10^3/UL (ref 1.7–8.2)
NEUTS SEG NFR BLD AUTO: 44.3 % (ref 42–78)
PCP UR QL SCN: NEGATIVE
PLATELET # BLD: 369 10^3/UL (ref 150–450)
POTASSIUM SERPL-SCNC: 4.6 MMOL/L (ref 3.6–5)
PROT SERPL-MCNC: 8.2 G/DL (ref 6.3–8.2)
RBC # BLD AUTO: 4.7 10^6/UL (ref 3.72–5.28)
TOTAL CELLS COUNTED % (AUTO): 100 %
URINE AMPHETAMINES SCREEN: NEGATIVE
URINE BENZODIAZEPINES SCREEN: (no result)
URINE COCAINE SCREEN: NEGATIVE
URINE MARIJUANA (THC) SCREEN: NEGATIVE
WBC # BLD AUTO: 7.1 10^3/UL (ref 4–10.5)

## 2019-12-22 PROCEDURE — 93010 ELECTROCARDIOGRAM REPORT: CPT

## 2019-12-22 PROCEDURE — 99285 EMERGENCY DEPT VISIT HI MDM: CPT

## 2019-12-22 PROCEDURE — 85025 COMPLETE CBC W/AUTO DIFF WBC: CPT

## 2019-12-22 PROCEDURE — 96372 THER/PROPH/DIAG INJ SC/IM: CPT

## 2019-12-22 PROCEDURE — 80053 COMPREHEN METABOLIC PANEL: CPT

## 2019-12-22 PROCEDURE — 93005 ELECTROCARDIOGRAM TRACING: CPT

## 2019-12-22 PROCEDURE — 36415 COLL VENOUS BLD VENIPUNCTURE: CPT

## 2019-12-22 PROCEDURE — 80307 DRUG TEST PRSMV CHEM ANLYZR: CPT

## 2019-12-22 PROCEDURE — 96360 HYDRATION IV INFUSION INIT: CPT

## 2019-12-22 NOTE — EKG REPORT
SEVERITY:- BORDERLINE ECG -

SINUS RHYTHM

BORDERLINE PROLONGED QT INTERVAL

:

Confirmed by: Giovanni Johnson 22-Dec-2019 17:14:08

## 2019-12-22 NOTE — ER DOCUMENT REPORT
Doctor's Note


Notes: 





12/22/19 09:56


This is a 49-year-old lady seen overnight by Dr. Alvarez with alcohol 

intoxication without documented blood alcohol around 250 at 3 AM.  She is too 

intoxicated to be adequately evaluated at that time.  She is now clinically 

sober and says that she would like to go [facility] with [facility].  She 

apparently has some PTSD issues and is chronically depressed and also has some 

chronic pain issues.  She is denying hallucinations or any suicidal/homicidal 

ideation.  I will ask for psychiatry evaluation and put in for consult.


12/22/19 12:52


Patient had been given 0.5 mg of Ativan orally earlier for complaint of anxiety 

and says that this is "not satisfactory".  Patient is complaining of burning and

cramping epigastric discomfort which I think is probably due to her abuse of 

alcohol.  She is hemodynamically stable at this time and afebrile.  She has mild

epigastric tenderness.  Certainly has no indication of a surgical abdomen and 

she indicates that she has long-term abdominal pain similar nature.  She says 

she is received Dilaudid for this elsewhere.  I explained to her that I do not 

feel Dilaudid is at all an appropriate agent.  She was offered a GI cocktail and

refused this.  She then asked for tramadol which I again explained to her was 

not an appropriate medication of the circumstances.  I will give her an IM 

injection of Benadryl.  She is being interviewed by mental health service at 

this time.


12/22/19 13:04


Mental health service concurs that patient is not suicidal or homicidal.  She 

[facility] [place] [facility] there for [facility].  She and 

her boyfriend have already spoken with someone at their crisis line and they 

indicate that she can come to the emergency department at that facility for 

evaluation.  She is being discharged and will be transported by her boyfriend at

this time.

## 2019-12-22 NOTE — PSYCHOLOGICAL NOTE
Psych Note





- Psych Note


Date seen by psych provider: 12/22/19


Time seen by psych provider: 12:30


Psych Note: 





Reason for consult: ETOH 





Patient is 49 year old female  with a history of PTSD and MST. Patient 

presents to ED via EMS. 





Patient was seen by behavioral health on 12/17/2019 with same concerns. Patient 

"self medicates" with ETOH. Patient's SYLVIA is 263. Patient was requesting 

medication for pain associated with Lupus and an abdominal hernia. Patient s

tates she went home on 12/17/2019 and changed her mind about going to 

[place]. 





Clinician provided psychoeducation to patient regarding a high blood alcohol 

content and likelihood of accidental overdose/death when mixed with benzos and 

pain medication. Clinician [facility] the [facility] for collateral 

information. 





Arabella confirmed PTSD and MST. Patient has recieved inpatient services through

the [facility]. Clinician contacted [facility], [place], and [facility] and was 

informed there are no available beds. Patient stated she will only receive 

services through the [facility]. 








Patient is alert and oriented to person, place, time and circumstance. Mood is 

normal with congruent affect. Patient denies suicidal and homicidal ideations. 

Delusions are absent and behavior is congruent with an intact reality based 

presentation (i.e.: organized and linear through processes). There is no 

observed behavior that suggests patient is responding to internal stimuli. 

Patient denies current auditory and visual hallucinations. Eye contact is 

appropriate. Conversational speech is somewhat slurred. At times patient would 

pause before answering questions. Intellectual ability appears to be within ave

rage range. Attention and concentration are good. Insight, judgment and impulse 

control are currently poor.





DSM Diagnosis:


PTSD


MST


Alcohol Use Disorder; Severe





Medication recommendations per [facility] contracted psychiatrist Dr. Sara ARTHUR is

as follows:


NONE





Impression/Plan: Patient is cleared from acute psychiatric services. Patient 

does not meet IVC criteria per [place] GS 122C. Patient denies suicidal and homicidal

ideations. There is no observed behavior that suggests patient is responding to 

internal stimuli. Patient denies current auditory and visual hallucinations. 

Patient verbalized insight that she needs help. Plan is for patient to leave ED 

and immediately present to the [facility] ED for further treatment. 

[facility] ED was advised patient is en route. Dr. Blakely was consulted on 

the care and management of this patient; attending physician is in agreement 

with recommendations and disposition.

## 2019-12-27 ENCOUNTER — HOSPITAL ENCOUNTER (EMERGENCY)
Dept: HOSPITAL 62 - ER | Age: 49
Discharge: HOME | End: 2019-12-27
Payer: OTHER GOVERNMENT

## 2019-12-27 VITALS — DIASTOLIC BLOOD PRESSURE: 64 MMHG | SYSTOLIC BLOOD PRESSURE: 109 MMHG

## 2019-12-27 DIAGNOSIS — M06.9: ICD-10-CM

## 2019-12-27 DIAGNOSIS — Z79.899: ICD-10-CM

## 2019-12-27 DIAGNOSIS — K46.9: Primary | ICD-10-CM

## 2019-12-27 DIAGNOSIS — R11.2: ICD-10-CM

## 2019-12-27 DIAGNOSIS — R74.0: ICD-10-CM

## 2019-12-27 DIAGNOSIS — R19.7: ICD-10-CM

## 2019-12-27 DIAGNOSIS — R10.84: ICD-10-CM

## 2019-12-27 DIAGNOSIS — F17.200: ICD-10-CM

## 2019-12-27 DIAGNOSIS — R10.817: ICD-10-CM

## 2019-12-27 DIAGNOSIS — K63.89: ICD-10-CM

## 2019-12-27 DIAGNOSIS — Z79.1: ICD-10-CM

## 2019-12-27 LAB
ADD MANUAL DIFF: NO
ALBUMIN SERPL-MCNC: 4.7 G/DL (ref 3.5–5)
ALP SERPL-CCNC: 72 U/L (ref 38–126)
ANION GAP SERPL CALC-SCNC: 18 MMOL/L (ref 5–19)
APPEARANCE UR: (no result)
APTT PPP: YELLOW S
AST SERPL-CCNC: 61 U/L (ref 14–36)
BASOPHILS # BLD AUTO: 0 10^3/UL (ref 0–0.2)
BASOPHILS NFR BLD AUTO: 0.2 % (ref 0–2)
BILIRUB DIRECT SERPL-MCNC: 0.5 MG/DL (ref 0–0.4)
BILIRUB SERPL-MCNC: 0.5 MG/DL (ref 0.2–1.3)
BILIRUB UR QL STRIP: NEGATIVE
BUN SERPL-MCNC: 11 MG/DL (ref 7–20)
CALCIUM: 9.9 MG/DL (ref 8.4–10.2)
CHLORIDE SERPL-SCNC: 103 MMOL/L (ref 98–107)
CO2 SERPL-SCNC: 23 MMOL/L (ref 22–30)
EOSINOPHIL # BLD AUTO: 0.3 10^3/UL (ref 0–0.6)
EOSINOPHIL NFR BLD AUTO: 3.4 % (ref 0–6)
ERYTHROCYTE [DISTWIDTH] IN BLOOD BY AUTOMATED COUNT: 13.5 % (ref 11.5–14)
GLUCOSE SERPL-MCNC: 86 MG/DL (ref 75–110)
GLUCOSE UR STRIP-MCNC: NEGATIVE MG/DL
HCT VFR BLD CALC: 44.2 % (ref 36–47)
HGB BLD-MCNC: 15.2 G/DL (ref 12–15.5)
KETONES UR STRIP-MCNC: NEGATIVE MG/DL
LYMPHOCYTES # BLD AUTO: 2.8 10^3/UL (ref 0.5–4.7)
LYMPHOCYTES NFR BLD AUTO: 33.1 % (ref 13–45)
MCH RBC QN AUTO: 32.6 PG (ref 27–33.4)
MCHC RBC AUTO-ENTMCNC: 34.3 G/DL (ref 32–36)
MCV RBC AUTO: 95 FL (ref 80–97)
MONOCYTES # BLD AUTO: 0.6 10^3/UL (ref 0.1–1.4)
MONOCYTES NFR BLD AUTO: 7.3 % (ref 3–13)
NEUTROPHILS # BLD AUTO: 4.7 10^3/UL (ref 1.7–8.2)
NEUTS SEG NFR BLD AUTO: 56 % (ref 42–78)
NITRITE UR QL STRIP: NEGATIVE
PH UR STRIP: 5 [PH] (ref 5–9)
PLATELET # BLD: 335 10^3/UL (ref 150–450)
POTASSIUM SERPL-SCNC: 4.3 MMOL/L (ref 3.6–5)
PROT SERPL-MCNC: 8.6 G/DL (ref 6.3–8.2)
PROT UR STRIP-MCNC: NEGATIVE MG/DL
RBC # BLD AUTO: 4.65 10^6/UL (ref 3.72–5.28)
SP GR UR STRIP: 1.01
TOTAL CELLS COUNTED % (AUTO): 100 %
UROBILINOGEN UR-MCNC: NEGATIVE MG/DL (ref ?–2)
WBC # BLD AUTO: 8.4 10^3/UL (ref 4–10.5)

## 2019-12-27 PROCEDURE — 85025 COMPLETE CBC W/AUTO DIFF WBC: CPT

## 2019-12-27 PROCEDURE — 36415 COLL VENOUS BLD VENIPUNCTURE: CPT

## 2019-12-27 PROCEDURE — 96374 THER/PROPH/DIAG INJ IV PUSH: CPT

## 2019-12-27 PROCEDURE — 99284 EMERGENCY DEPT VISIT MOD MDM: CPT

## 2019-12-27 PROCEDURE — 81001 URINALYSIS AUTO W/SCOPE: CPT

## 2019-12-27 PROCEDURE — 83690 ASSAY OF LIPASE: CPT

## 2019-12-27 PROCEDURE — 96372 THER/PROPH/DIAG INJ SC/IM: CPT

## 2019-12-27 PROCEDURE — 80053 COMPREHEN METABOLIC PANEL: CPT

## 2019-12-27 PROCEDURE — 74177 CT ABD & PELVIS W/CONTRAST: CPT

## 2019-12-27 PROCEDURE — 96361 HYDRATE IV INFUSION ADD-ON: CPT

## 2019-12-27 NOTE — RADIOLOGY REPORT (SQ)
EXAM DESCRIPTION:  CT ABD/PELVIS WITH IV ONLY



COMPLETED DATE/TIME:  12/27/2019 12:05 pm



REASON FOR STUDY:  abd pain, n/v/d, hx abd hernia



COMPARISON:  CT of the abdomen and pelvis from 8/25/2019.



TECHNIQUE:  CT scan of the abdomen and pelvis performed using helical scanning technique with dynamic
 intravenous contrast injection.  No oral contrast. Images reviewed with lung, soft tissue, and bone 
windows. Reconstructed coronal and sagittal MPR images reviewed. Delayed images for evaluation of the
 urinary system also acquired. All images stored on PACS.

All CT scanners at this facility use dose modulation, iterative reconstruction, and/or weight based d
osing when appropriate to reduce radiation dose to as low as reasonably achievable (ALARA).

[facility]: Dose Right  [facility]: CareDose    [facility]: Dose Right    [facility]: Teradose 4D    [facility]: Smart Technologies



CONTRAST TYPE AND DOSE:  Contrast/concentration: Isovue 350.00 mg/ml; Total Contrast Delivered: 87.0 
ml; Total Saline Delivered: 55.3 ml



RENAL FUNCTION:  Creatinine 0.81 milligrams/deciliter



RADIATION DOSE:  CT Rad equipment meets quality standard of care and radiation dose reduction techniq
ues were employed. CTDIvol: 8.0 - 11.4 mGy. DLP: 1033 mGy-cm..



LIMITATIONS:  None.



FINDINGS:  LOWER CHEST: Bibasilar atelectasis.  No cardiomegaly or pericardial effusion.

LIVER: Hepatic steatosis.  The morphology of the liver is non cirrhotic.  The portal and hepatic vein
s are patent.  There is no hepatic mass.

SPLEEN: No splenomegaly or splenic mass.

PANCREAS: No abnormality of the pancreas.

GALLBLADDER: The gallbladder is surgically absent.  The intra- and extrahepatic bile ducts are normal
 in caliber.

ADRENAL GLANDS: No mass or asymmetry.

RIGHT KIDNEY AND URETER: No solid masses.   No calcifications.   No hydronephrosis or hydroureter.

LEFT KIDNEY AND URETER: No solid masses.   No calcifications.   No hydronephrosis or hydroureter.

AORTA AND VESSELS: No aneurysm or dissection of the abdominal aorta.  Variant circumaortic left renal
 vein.

RETROPERITONEUM: No retroperitoneal adenopathy, hemorrhage or mass.

BOWEL AND PERITONEAL CAVITY: There is an ovoid fat attenuation structure with a thin echogenic rim in
 the left lower quadrant (image 64 of series 3) that could represent an acute epiploic appendagitis. 
 There is no bowel obstruction, bowel wall thickening, or pericolonic/perienteric inflammation.  Ther
e is no mesenteric adenopathy or free intraperitoneal fluid.

APPENDIX: Normal.

PELVIS: The urinary bladder is normal in appearance.  There is a hypodense lesion in the left adnexa 
that measures 1.7 x 1.3 cm that could represent an ovarian cyst.  There is no abnormality of the uter
us or right adnexa that is apparent on CT.

ABDOMINAL WALL: No masses or hernias.

BONES: No acute findings.

OTHER: No other finding.



IMPRESSION:  1. Ovoid fat attenuation structure with a thin echogenic rim in the left lower quadrant 
(image 64 of series 3) that could represent an acute epiploic appendagitis.

2.  Hepatic steatosis.



TECHNICAL DOCUMENTATION:  JOB ID:  [id number]

Quality ID # 436: Final reports with documentation of one or more dose reduction techniques (e.g., Au
tomated exposure control, adjustment of the mA and/or kV according to patient size, use of iterative 
reconstruction technique)

 2011 [employer]- All Rights Reserved



Reading location - IP/workstation name: [email or web address]

## 2019-12-27 NOTE — ER DOCUMENT REPORT
ED General





- General


Chief Complaint: Abdominal Pain


Stated Complaint: ABDOMINAL PAIN


Time Seen by Provider: 12/27/19 11:11


Primary Care Provider: 


ANURADHA RENAE MD [ACTIVE STAFF] - Follow up in 1 week


[facility] [[facility] - Follow up in 3-5 days


Notes: 





49-year-old female presents with abdominal pain secondary to an abdominal hernia

that is been ongoing for 4 years.  Patient states she has daily merary

sea/vomiting/diarrhea.  Patient denies any fevers/chills.  Patient does admit to

alcohol use.  Patient denies any chest pain or shortness of breath.


TRAVEL OUTSIDE OF THE [place] IN LAST 30 DAYS: No





- Related Data


Allergies/Adverse Reactions: 


                                        





No Known Allergies Allergy (Verified 12/22/19 02:43)


   








Home Medications: etodolac.  gabapentin





Past Medical History





- Social History


Smoking Status: Current Some Day Smoker


Frequency of alcohol use: 3 glasses of wine


Family History: Reviewed & Not Pertinent


Patient has suicidal ideation: No


Patient has homicidal ideation: No


Renal/ Medical History: Denies: Hx Peritoneal Dialysis


Musculoskeletal Medical History: Reports Hx Arthritis - RA


Past Surgical History: Reports: Hx Abdominal Surgery - hernia repair, Hx 

Orthopedic Surgery - Right rotator cuff





- Immunizations


Immunizations up to date: Yes


Hx Diphtheria, Pertussis, Tetanus Vaccination: Yes





Review of Systems





- Review of Systems


Notes: 





Constitutional: Negative for fever.


HENT: Negative for sore throat.


Eyes: Negative for visual changes.


Cardiovascular: Negative for chest pain.


Respiratory: Negative for shortness of breath.


Gastrointestinal: Positive for abdominal pain, vomiting or diarrhea.


Genitourinary: Negative for dysuria.


Musculoskeletal: Negative for back pain.


Skin: Negative for rash.


Neurological: Negative for headaches, weakness or numbness.





10 point ROS negative except as marked above and in HPI.





Physical Exam





- Vital signs


Vitals: 


                                        











Temp Pulse Resp BP Pulse Ox


 


 98.3 F   117 H  20   137/92 H  97 


 


 12/27/19 04:52  12/27/19 04:52  12/27/19 04:52  12/27/19 04:52  12/27/19 04:52














- Notes


Notes: 





GENERAL: Well-appearing, well-nourished and in no acute distress.


HEAD: Atraumatic, normocephalic.


EYES: Extraocular movements intact, sclera anicteric, conjunctiva are normal.


NECK: Normal range of motion, supple without lymphadenopathy or JVD.


LUNGS: Breath sounds clear to auscultation bilaterally and equal.  No wheezes 

rales or rhonchi.


HEART: Regular rate and rhythm without murmurs, rubs or gallops.


ABDOMEN: Soft, diffusely tender.  Abdominal hernia noted which is soft and 

reducible.  No guarding, no rebound.  No masses appreciated.


EXTREMITIES: Normal range of motion, no pitting or edema.  No clubbing or 

cyanosis.


NEUROLOGICAL: Cranial nerves II through XII grossly intact.  Normal speech, 

normal gait.


PSYCH: Normal mood, normal affect.


SKIN: Warm, Dry, normal turgor, no rashes or lesions noted.





Course





- Re-evaluation


Re-evalutation: 





12/27/19 49-year-old female presents for 4-year history of abdominal pain second

quinten to an abdominal hernia with daily nausea/vomiting/diarrhea.  Nontoxic, well-

appearing.  Afebrile.  Abdomen is soft and diffusely tender with no rebound or 

guarding.  Hernia is reducible and soft.  PE is otherwise unremarkable.  Lab 

work is unremarkable.  No leukocytosis.  Mildly elevated AST.  CT abdomen/pelvis

with IV contrast ordered.  Bentyl IM and Zofran IV with IV fluid bolus also 

ordered.





12/27/19 13:07 CT scan shows epiploic appendagitis. Will treat pt with Bentyl 

and refer pt to general surgery. Discussed plan of care with pt who agrees. Also

discussed pt with Dr. Vaughn, attending, who agrees with plan of care.














- Vital Signs


Vital signs: 


                                        











Temp Pulse Resp BP Pulse Ox


 


 98.0 F   91   17   109/64   100 


 


 12/27/19 13:28  12/27/19 13:28  12/27/19 13:28  12/27/19 13:28  12/27/19 13:28














- Laboratory


Result Diagrams: 


                                 12/27/19 05:57





                                 12/27/19 05:57


Laboratory results interpreted by me: 


                                        











  12/27/19





  05:57


 


Direct Bilirubin  0.5 H


 


AST  61 H


 


Total Protein  8.6 H














Discharge





- Discharge


Clinical Impression: 


 Epiploic appendagitis





Abdominal pain


Qualifiers:


 Abdominal location: generalized Qualified Code(s): R10.84 - Generalized 

abdominal pain





Condition: Stable


Disposition: HOME, SELF-CARE


Instructions:  Abdominal Pain ([facility])


Additional Instructions: 


Your CT scan shows epiploic appendagitis which is usually self-limiting.  Please

take medications as prescribed.  Please stop drinking.  Please follow-up with 

the surgeon listed in 1 to 2 weeks.  Please follow-up with your primary care 

doctor in 3 to 5 days.  Return immediately to ER if you start having any 

worsening symptoms, including fever, worsening abdominal pain, nausea/vomiting, 

diarrhea, constipation, chest pain, shortness of breath, or any other symptoms 

that are concerning to you.


Prescriptions: 


Ibuprofen [Motrin 600 mg Tablet] 600 mg PO Q8HP PRN #20 tablet


 PRN Reason: 


Dicyclomine HCl [Bentyl 20 mg Tablet] 20 mg PO QID #40 tablet


Referrals: 


[facility] [[facility] - Follow up in 3-5 days


ANURADHA RENAE MD [ACTIVE STAFF] - Follow up in 1 week

## 2020-01-12 ENCOUNTER — HOSPITAL ENCOUNTER (EMERGENCY)
Dept: HOSPITAL 62 - ER | Age: 50
Discharge: HOME | End: 2020-01-12
Payer: OTHER GOVERNMENT

## 2020-01-12 VITALS — SYSTOLIC BLOOD PRESSURE: 130 MMHG | DIASTOLIC BLOOD PRESSURE: 88 MMHG
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
## 2020-01-12 DIAGNOSIS — F17.200: ICD-10-CM

## 2020-01-12 DIAGNOSIS — R14.0: ICD-10-CM

## 2020-01-12 DIAGNOSIS — F10.129: Primary | ICD-10-CM

## 2020-01-12 DIAGNOSIS — S30.1XXA: ICD-10-CM

## 2020-01-12 LAB
ADD MANUAL DIFF: NO
ALBUMIN SERPL-MCNC: 4.7 G/DL (ref 3.5–5)
ALP SERPL-CCNC: 79 U/L (ref 38–126)
ANION GAP SERPL CALC-SCNC: 17 MMOL/L (ref 5–19)
APAP SERPL-MCNC: < 10 UG/ML (ref 10–30)
APPEARANCE UR: CLEAR
APTT PPP: YELLOW S
AST SERPL-CCNC: 53 U/L (ref 14–36)
BARBITURATES UR QL SCN: NEGATIVE
BASOPHILS # BLD AUTO: 0 10^3/UL (ref 0–0.2)
BASOPHILS NFR BLD AUTO: 0.2 % (ref 0–2)
BILIRUB DIRECT SERPL-MCNC: 0.8 MG/DL (ref 0–0.4)
BILIRUB SERPL-MCNC: 0.8 MG/DL (ref 0.2–1.3)
BILIRUB UR QL STRIP: (no result)
BUN SERPL-MCNC: 7 MG/DL (ref 7–20)
CALCIUM: 9.5 MG/DL (ref 8.4–10.2)
CHLORIDE SERPL-SCNC: 108 MMOL/L (ref 98–107)
CO2 SERPL-SCNC: 21 MMOL/L (ref 22–30)
EOSINOPHIL # BLD AUTO: 0.3 10^3/UL (ref 0–0.6)
EOSINOPHIL NFR BLD AUTO: 3.3 % (ref 0–6)
ERYTHROCYTE [DISTWIDTH] IN BLOOD BY AUTOMATED COUNT: 13.8 % (ref 11.5–14)
ETHANOL SERPL-MCNC: 204 MG/DL
GLUCOSE SERPL-MCNC: 98 MG/DL (ref 75–110)
GLUCOSE UR STRIP-MCNC: NEGATIVE MG/DL
HCT VFR BLD CALC: 41.3 % (ref 36–47)
HGB BLD-MCNC: 14.3 G/DL (ref 12–15.5)
KETONES UR STRIP-MCNC: NEGATIVE MG/DL
LYMPHOCYTES # BLD AUTO: 2.8 10^3/UL (ref 0.5–4.7)
LYMPHOCYTES NFR BLD AUTO: 35.8 % (ref 13–45)
MCH RBC QN AUTO: 32.7 PG (ref 27–33.4)
MCHC RBC AUTO-ENTMCNC: 34.7 G/DL (ref 32–36)
MCV RBC AUTO: 94 FL (ref 80–97)
METHADONE UR QL SCN: NEGATIVE
MONOCYTES # BLD AUTO: 0.7 10^3/UL (ref 0.1–1.4)
MONOCYTES NFR BLD AUTO: 8.7 % (ref 3–13)
NEUTROPHILS # BLD AUTO: 4.1 10^3/UL (ref 1.7–8.2)
NEUTS SEG NFR BLD AUTO: 52 % (ref 42–78)
NITRITE UR QL STRIP: NEGATIVE
PCP UR QL SCN: NEGATIVE
PH UR STRIP: 5 [PH] (ref 5–9)
PLATELET # BLD: 328 10^3/UL (ref 150–450)
POTASSIUM SERPL-SCNC: 4.1 MMOL/L (ref 3.6–5)
PROT SERPL-MCNC: 8.7 G/DL (ref 6.3–8.2)
PROT UR STRIP-MCNC: NEGATIVE MG/DL
RBC # BLD AUTO: 4.38 10^6/UL (ref 3.72–5.28)
SALICYLATES SERPL-MCNC: < 1 MG/DL (ref 2–20)
SP GR UR STRIP: 1.01
TOTAL CELLS COUNTED % (AUTO): 100 %
URINE AMPHETAMINES SCREEN: NEGATIVE
URINE BENZODIAZEPINES SCREEN: (no result)
URINE COCAINE SCREEN: NEGATIVE
URINE MARIJUANA (THC) SCREEN: NEGATIVE
UROBILINOGEN UR-MCNC: 4 MG/DL (ref ?–2)
WBC # BLD AUTO: 7.8 10^3/UL (ref 4–10.5)

## 2020-01-12 PROCEDURE — 96361 HYDRATE IV INFUSION ADD-ON: CPT

## 2020-01-12 PROCEDURE — 80307 DRUG TEST PRSMV CHEM ANLYZR: CPT

## 2020-01-12 PROCEDURE — 81001 URINALYSIS AUTO W/SCOPE: CPT

## 2020-01-12 PROCEDURE — 70450 CT HEAD/BRAIN W/O DYE: CPT

## 2020-01-12 PROCEDURE — 99284 EMERGENCY DEPT VISIT MOD MDM: CPT

## 2020-01-12 PROCEDURE — 85025 COMPLETE CBC W/AUTO DIFF WBC: CPT

## 2020-01-12 PROCEDURE — 93010 ELECTROCARDIOGRAM REPORT: CPT

## 2020-01-12 PROCEDURE — 74177 CT ABD & PELVIS W/CONTRAST: CPT

## 2020-01-12 PROCEDURE — 96360 HYDRATION IV INFUSION INIT: CPT

## 2020-01-12 PROCEDURE — 80053 COMPREHEN METABOLIC PANEL: CPT

## 2020-01-12 PROCEDURE — 93005 ELECTROCARDIOGRAM TRACING: CPT

## 2020-01-12 PROCEDURE — 36415 COLL VENOUS BLD VENIPUNCTURE: CPT

## 2020-01-12 PROCEDURE — 83690 ASSAY OF LIPASE: CPT

## 2020-01-12 NOTE — EKG REPORT
SEVERITY:- BORDERLINE ECG -

SINUS RHYTHM

BORDERLINE T ABNORMALITIES, ANT-LAT LEADS

:

Confirmed by: Giovanni Johnson 12-Jan-2020 20:43:53

## 2020-01-12 NOTE — RADIOLOGY REPORT (SQ)
EXAM DESCRIPTION:  CT ABD/PELVIS WITH IV ONLY



COMPLETED DATE/TIME:  1/12/2020 9:53 am



REASON FOR STUDY:  abdominal pain, bruising tender



COMPARISON:   12/27/2019.



TECHNIQUE:  CT scan of the abdomen and pelvis performed using helical scanning technique with dynamic
 intravenous contrast injection.  No oral contrast. Images reviewed with lung, soft tissue, and bone 
windows. Reconstructed coronal and sagittal MPR images reviewed. Delayed images for evaluation of the
 urinary system also acquired. All images stored on PACS.

All CT scanners at this facility use dose modulation, iterative reconstruction, and/or weight based d
osing when appropriate to reduce radiation dose to as low as reasonably achievable (ALARA).

[facility]: Dose Right  [facility]: CareDose    [facility]: Dose Right    [facility]: Teradose 4D    [facility]: [employer]



CONTRAST TYPE AND DOSE:  contrast/concentration: Isovue 350.00 mg/ml; Total Contrast Delivered: 91.0 
ml; Total Saline Delivered: 41.0 ml



RENAL FUNCTION:  None required. The patient is less than 50 years old.



RADIATION DOSE:  CT Rad equipment meets quality standard of care and radiation dose reduction techniq
ues were employed. CTDIvol: 9.6 - 14.4 mGy. DLP: 1340 mGy-cm..



LIMITATIONS:  None.



FINDINGS:  LOWER CHEST: Small hiatal hernia.  Clear lung bases.

LIVER: Diffusely fatty without evidence of laceration or mass or perihepatic fluid.

SPLEEN: No evidence of injury.

PANCREAS: No masses. No significant calcifications. No adjacent inflammation or peripancreatic fluid 
collections. Pancreatic duct not dilated.

GALLBLADDER: Surgically absent.

ADRENAL GLANDS: No significant masses or asymmetry.

RIGHT KIDNEY AND URETER: No solid masses. No significant calcification. No hydronephrosis or hydroure
ter.

LEFT KIDNEY AND URETER: No solid masses. No significant calcification. No hydronephrosis or hydrouret
er.

AORTA AND VESSELS: No aneurysm. No dissection. Renal arteries, SMA, celiac without stenosis.

RETROPERITONEUM: No retroperitoneal adenopathy, hemorrhage or masses.

BOWEL AND PERITONEAL CAVITY: No evidence of bowel obstruction.  No ascites or abnormal gas or perfora
tion.  Minimal proximal small bowel distention, likely minimal ileus.

APPENDIX: Normal.

PELVIS: No mass.  No free fluid. Normal bladder.

ABDOMINAL WALL: No bowel containing hernia or mass.

BONES: No significant or acute findings.

OTHER: No other significant finding.



IMPRESSION:

1. No acute abdominopelvic injury.  Mild ileus.

2. Fatty liver.



TECHNICAL DOCUMENTATION:  JOB ID:  [id number]

Quality ID # 436: Final reports with documentation of one or more dose reduction techniques (e.g., Au
tomated exposure control, adjustment of the mA and/or kV according to patient size, use of iterative 
reconstruction technique)

 2011 [employer]- All Rights Reserved



Reading location - IP/workstation name: [email or web address]

## 2020-01-12 NOTE — RADIOLOGY REPORT (SQ)
EXAM DESCRIPTION:  CT HEAD WITHOUT



COMPLETED DATE/TIME:  1/12/2020 9:52 am



REASON FOR STUDY:  alcoholic, fall



COMPARISON:  None.



TECHNIQUE:  Axial images acquired through the brain without intravenous contrast.  Images reviewed wi
th bone, brain and subdural windows.   Images stored on PACS.

All CT scanners at this facility use dose modulation, iterative reconstruction, and/or weight based d
osing when appropriate to reduce radiation dose to as low as reasonably achievable (ALARA).

[facility]: Dose Right  [facility]: CareDose    [facility]: Dose Right    [facility]: Teradose 4D    [facility]: Smart Technologies



RADIATION DOSE:  CT Rad equipment meets quality standard of care and radiation dose reduction techniq
ues were employed. CTDIvol: 53.2 mGy. DLP: 911 mGy-cm. mGy.



LIMITATIONS:  None.



FINDINGS:  VENTRICLES: Normal size and contour.

CEREBRUM: No masses.  No hemorrhage.  No midline shift.  No evidence for acute infarction. Normal gra
y/white matter differentiation. No areas of low density in the white matter.

CEREBELLUM: No masses.  No hemorrhage.  No alteration of density.  No evidence for acute infarction.

EXTRAAXIAL SPACES: No fluid collections.  No masses.

ORBITS AND GLOBE: No intra- or extraconal masses.  Normal contour of globe without masses.

CALVARIUM: No fracture.

PARANASAL SINUSES: No fluid or mucosal thickening.

SOFT TISSUES: No mass or hematoma.

OTHER: No other significant finding.



IMPRESSION:  NORMAL BRAIN CT WITHOUT CONTRAST.

EVIDENCE OF ACUTE STROKE: NO.



COMMENT:  Quality ID # 436: Final reports with documentation of one or more dose reduction techniques
 (e.g., Automated exposure control, adjustment of the mA and/or kV according to patient size, use of 
iterative reconstruction technique)



TECHNICAL DOCUMENTATION:  JOB ID:  [id number]

 2011 [employer]- All Rights Reserved



Reading location - IP/workstation name: [email or web address]
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
## 2020-02-18 ENCOUNTER — HOSPITAL ENCOUNTER (EMERGENCY)
Dept: HOSPITAL 62 - ER | Age: 50
Discharge: LEFT BEFORE DISCHARGE | End: 2020-02-18
Payer: OTHER GOVERNMENT

## 2020-02-18 DIAGNOSIS — Z53.21: Primary | ICD-10-CM

## 2020-12-28 ENCOUNTER — HOSPITAL ENCOUNTER (EMERGENCY)
Dept: HOSPITAL 62 - ER | Age: 50
Discharge: HOME | End: 2020-12-28
Payer: OTHER GOVERNMENT

## 2020-12-28 VITALS — SYSTOLIC BLOOD PRESSURE: 121 MMHG | DIASTOLIC BLOOD PRESSURE: 88 MMHG

## 2020-12-28 DIAGNOSIS — F41.9: ICD-10-CM

## 2020-12-28 DIAGNOSIS — R07.89: Primary | ICD-10-CM

## 2020-12-28 DIAGNOSIS — R00.2: ICD-10-CM

## 2020-12-28 DIAGNOSIS — F17.210: ICD-10-CM

## 2020-12-28 LAB
ADD MANUAL DIFF: NO
ALBUMIN SERPL-MCNC: 4.2 G/DL (ref 3.5–5)
ALP SERPL-CCNC: 95 U/L (ref 38–126)
ANION GAP SERPL CALC-SCNC: 12 MMOL/L (ref 5–19)
APPEARANCE UR: (no result)
APTT PPP: YELLOW S
AST SERPL-CCNC: 31 U/L (ref 14–36)
BARBITURATES UR QL SCN: NEGATIVE
BASOPHILS # BLD AUTO: 0.1 10^3/UL (ref 0–0.2)
BASOPHILS NFR BLD AUTO: 0.9 % (ref 0–2)
BILIRUB DIRECT SERPL-MCNC: 0.3 MG/DL (ref 0–0.4)
BILIRUB SERPL-MCNC: 0.5 MG/DL (ref 0.2–1.3)
BILIRUB UR QL STRIP: NEGATIVE
BUN SERPL-MCNC: 7 MG/DL (ref 7–20)
CALCIUM: 8.9 MG/DL (ref 8.4–10.2)
CHLORIDE SERPL-SCNC: 108 MMOL/L (ref 98–107)
CK MB SERPL-MCNC: < 0.22 NG/ML (ref ?–4.55)
CK SERPL-CCNC: 22 U/L (ref 30–135)
CO2 SERPL-SCNC: 20 MMOL/L (ref 22–30)
EOSINOPHIL # BLD AUTO: 0.2 10^3/UL (ref 0–0.6)
EOSINOPHIL NFR BLD AUTO: 1.3 % (ref 0–6)
ERYTHROCYTE [DISTWIDTH] IN BLOOD BY AUTOMATED COUNT: 15.5 % (ref 11.5–14)
FREE T4 (FREE THYROXINE): 1.15 NG/DL (ref 0.78–2.19)
GLUCOSE SERPL-MCNC: 124 MG/DL (ref 75–110)
GLUCOSE UR STRIP-MCNC: 50 MG/DL
HCT VFR BLD CALC: 44.4 % (ref 36–47)
HGB BLD-MCNC: 15.5 G/DL (ref 12–15.5)
KETONES UR STRIP-MCNC: NEGATIVE MG/DL
LYMPHOCYTES # BLD AUTO: 3.2 10^3/UL (ref 0.5–4.7)
LYMPHOCYTES NFR BLD AUTO: 24.1 % (ref 13–45)
MCH RBC QN AUTO: 30.2 PG (ref 27–33.4)
MCHC RBC AUTO-ENTMCNC: 34.9 G/DL (ref 32–36)
MCV RBC AUTO: 87 FL (ref 80–97)
METHADONE UR QL SCN: NEGATIVE
MONOCYTES # BLD AUTO: 1.3 10^3/UL (ref 0.1–1.4)
MONOCYTES NFR BLD AUTO: 9.3 % (ref 3–13)
NEUTROPHILS # BLD AUTO: 8.7 10^3/UL (ref 1.7–8.2)
NEUTS SEG NFR BLD AUTO: 64.4 % (ref 42–78)
NITRITE UR QL STRIP: NEGATIVE
PCP UR QL SCN: NEGATIVE
PH UR STRIP: 6 [PH] (ref 5–9)
PLATELET # BLD: 337 10^3/UL (ref 150–450)
POTASSIUM SERPL-SCNC: 4.1 MMOL/L (ref 3.6–5)
PROT SERPL-MCNC: 7.8 G/DL (ref 6.3–8.2)
PROT UR STRIP-MCNC: NEGATIVE MG/DL
RBC # BLD AUTO: 5.13 10^6/UL (ref 3.72–5.28)
SP GR UR STRIP: 1.02
T3FREE SERPL-MCNC: 3.31 PG/ML (ref 2.77–5.27)
TOTAL CELLS COUNTED % (AUTO): 100 %
TROPONIN I SERPL-MCNC: < 0.012 NG/ML
TSH SERPL-ACNC: 5.82 UIU/ML (ref 0.47–4.68)
URINE AMPHETAMINES SCREEN: NEGATIVE
URINE BENZODIAZEPINES SCREEN: NEGATIVE
URINE COCAINE SCREEN: NEGATIVE
URINE MARIJUANA (THC) SCREEN: NEGATIVE
UROBILINOGEN UR-MCNC: 2 MG/DL (ref ?–2)
WBC # BLD AUTO: 13.5 10^3/UL (ref 4–10.5)

## 2020-12-28 PROCEDURE — 81001 URINALYSIS AUTO W/SCOPE: CPT

## 2020-12-28 PROCEDURE — 36415 COLL VENOUS BLD VENIPUNCTURE: CPT

## 2020-12-28 PROCEDURE — 84481 FREE ASSAY (FT-3): CPT

## 2020-12-28 PROCEDURE — 80053 COMPREHEN METABOLIC PANEL: CPT

## 2020-12-28 PROCEDURE — 84443 ASSAY THYROID STIM HORMONE: CPT

## 2020-12-28 PROCEDURE — 93010 ELECTROCARDIOGRAM REPORT: CPT

## 2020-12-28 PROCEDURE — 96361 HYDRATE IV INFUSION ADD-ON: CPT

## 2020-12-28 PROCEDURE — 84439 ASSAY OF FREE THYROXINE: CPT

## 2020-12-28 PROCEDURE — 71045 X-RAY EXAM CHEST 1 VIEW: CPT

## 2020-12-28 PROCEDURE — 82550 ASSAY OF CK (CPK): CPT

## 2020-12-28 PROCEDURE — 96374 THER/PROPH/DIAG INJ IV PUSH: CPT

## 2020-12-28 PROCEDURE — 96375 TX/PRO/DX INJ NEW DRUG ADDON: CPT

## 2020-12-28 PROCEDURE — 84484 ASSAY OF TROPONIN QUANT: CPT

## 2020-12-28 PROCEDURE — 83690 ASSAY OF LIPASE: CPT

## 2020-12-28 PROCEDURE — 85379 FIBRIN DEGRADATION QUANT: CPT

## 2020-12-28 PROCEDURE — 82553 CREATINE MB FRACTION: CPT

## 2020-12-28 PROCEDURE — 80307 DRUG TEST PRSMV CHEM ANLYZR: CPT

## 2020-12-28 PROCEDURE — 93005 ELECTROCARDIOGRAM TRACING: CPT

## 2020-12-28 PROCEDURE — 81025 URINE PREGNANCY TEST: CPT

## 2020-12-28 PROCEDURE — 85025 COMPLETE CBC W/AUTO DIFF WBC: CPT

## 2020-12-28 PROCEDURE — 99285 EMERGENCY DEPT VISIT HI MDM: CPT

## 2020-12-28 NOTE — RADIOLOGY REPORT (SQ)
EXAM DESCRIPTION: 



XR CHEST 1 VIEW



COMPLETED DATE/TME:  12/28/2020 03:54



CLINICAL HISTORY: 



50 years, Female, CP



COMPARISON:

8/25/2019 chest



NUMBER OF VIEWS:

1



TECHNIQUE:

Portable chest



LIMITATIONS:

None.



FINDINGS:



The heart size is normal. Calcified granuloma in the lateral

right lung base. Lungs otherwise clear. No pneumothorax



IMPRESSION:



No acute cardiopulmonary process

 



copyright 2011 [employer]- All Rights Reserved

## 2020-12-28 NOTE — EKG REPORT
SEVERITY:- BORDERLINE ECG -

SINUS TACHYCARDIA

CONSIDER ANTERIOR INFARCT

:

Confirmed by: Efraín Joseph MD 28-Dec-2020 06:34:57

## 2020-12-28 NOTE — ER DOCUMENT REPORT
Entered by CINTIA LOCKETT SCRIBE  12/28/20 0705 





Acting as scribe for:WINSTON VELASCO MD





ED General





- General


Chief Complaint: Chest Pain


Stated Complaint: CHEST PAIN


Time Seen by Provider: 12/28/20 06:54


Primary Care Provider: 


SHIN POMPA MD [ACTIVE STAFF] - Follow up as needed


[facility] [[facility]] - Follow up as needed


Mode of Arrival: Ambulatory


Information source: Patient


Notes: 





This 50 year old female patient presents to the ED today with complaints of 

sternal chest pain that started x3 days ago. Patient states that she "felt her 

heart literally skip a beat" while she was driving and that she has been 

experiencing this chest pain on and off since then with palpitations and 

anxiety. She describes the pain as a heaviness and tightness. Denies any other 

complaints.





TRAVEL OUTSIDE OF THE [place] IN LAST 30 DAYS: No





- Related Data


Allergies/Adverse Reactions: 


                                        





No Known Allergies Allergy (Verified 12/22/19 02:43)


   











Past Medical History





- General


Information source: Patient, [facility] Records





- Social History


Smoking Status: Current Every Day Smoker


Cigarette use (# per day): Yes - 0.5 ppd


Chew tobacco use (# tins/day): No


Smoking Education Provided: No


Frequency of alcohol use: Occasional


Drug Abuse: None


Family History: Reviewed & Not Pertinent


GI Medical History: Reports: Hx Ulcerative Colitis


Musculoskeletal Medical History: Reports Hx Arthritis - RA


Past Surgical History: Reports: Hx Orthopedic Surgery - Right rotator cuff, Hx 

Umbilical Hernia





- Immunizations


Immunizations up to date: Yes


Hx Diphtheria, Pertussis, Tetanus Vaccination: Yes





Review of Systems





- Review of Systems


Constitutional: No symptoms reported


EENT: No symptoms reported


Cardiovascular: See HPI, Chest pain, Palpitations


Respiratory: No symptoms reported


Gastrointestinal: No symptoms reported


Genitourinary: No symptoms reported


Female Genitourinary: No symptoms reported


Musculoskeletal: No symptoms reported


Skin: No symptoms reported


Hematologic/Lymphatic: No symptoms reported


Neurological/Psychological: See HPI, Anxiety


-: Yes All other systems reviewed and negative





Physical Exam





- Vital signs


Vitals: 


                                        











Resp BP Pulse Ox


 


 19   159/105 H  95 


 


 12/28/20 03:13  12/28/20 03:13  12/28/20 03:13














- General


General appearance: Alert, Anxious


In distress: None





- HEENT


Head: Normocephalic, Atraumatic


Eyes: Normal


Pupils: PERRL


Neck: Normal, Supple





- Respiratory


Respiratory status: No respiratory distress


Chest status: Tender - Tenderness to palpation over the anterior chest wall, 

sternum, and lateral pectoralis musculature


Breath sounds: Normal


Chest palpation: Normal





- Cardiovascular


Rhythm: Regular - Rate 118 bpm


Heart sounds: Normal auscultation


Murmur: No


Friction rub: No


Gallop: None auscultated





- Abdominal


Inspection: Normal


Distension: No distension


Bowel sounds: Normal


Tenderness: Nontender - Abdomen soft


Organomegaly: No organomegaly





- Back


Back: Normal, Nontender





- Extremities


General upper extremity: Normal inspection


General lower extremity: Normal inspection.  No: Edema





- Neurological


Neuro grossly intact: Yes


Orientation: AAOx4


Jachin Coma Scale Eye Opening: Spontaneous


Macario Coma Scale Verbal: Oriented


Jachin Coma Scale Motor: Obeys Commands


Macario Coma Scale Total: 15





- Psychological


Associated symptoms: Anxious





- Skin


Skin Temperature: Warm


Skin Moisture: Dry


Skin Color: Normal





Course





- Vital Signs


Vital signs: 


                                        











Temp Pulse Resp BP Pulse Ox


 


 97.8 F   139 H  20   142/103 H  95 


 


 12/28/20 07:30  12/28/20 03:27  12/28/20 07:30  12/28/20 07:30  12/28/20 07:30














- Laboratory Results


Result Diagrams: 


                                 12/28/20 03:22





                                 12/28/20 03:22


Laboratory Results Interpreted: 


                                        











  12/28/20 12/28/20 12/28/20





  03:22 03:22 05:07


 


WBC  13.5 H  


 


RDW  15.5 H  


 


Absolute Neuts (auto)  8.7 H  


 


Chloride   108 H 


 


Carbon Dioxide   20 L 


 


Glucose   124 H 


 


Creatine Kinase   22 L 


 


TSH    5.82 H


 


Urine Glucose (UA)   


 


Urine Blood   


 


Urine Urobilinogen   














  12/28/20





  06:38


 


WBC 


 


RDW 


 


Absolute Neuts (auto) 


 


Chloride 


 


Carbon Dioxide 


 


Glucose 


 


Creatine Kinase 


 


TSH 


 


Urine Glucose (UA)  50 H


 


Urine Blood  SMALL H


 


Urine Urobilinogen  2.0 H











Critical Laboratory Results Reviewed: No Critical Results





- Radiology Results


Critical Radiology Results Reviewed: No Critical Results





- EKG Interpretation by Me


EKG shows normal: Sinus rhythm, Axis, Intervals, QRS Complexes, ST-T Waves


Rate: Tachycardia - 140


When compared to previous EKG there are: Changes noted





Discharge





- Discharge


Clinical Impression: 


 Chest wall pain, Tachycardia, Anxiety





Condition: Stable


Disposition: HOME, SELF-CARE


Additional Instructions: 


Anxiety





     The physician feels that some of your health problems are being caused by 

anxiety.  Anxiety affects your health in many ways.  Anxiety alone can cause 

palpitations, sweats, chest pains, abdominal pains, shortness of breath, and 

headaches.  It contributes to ulcer disease, high blood pressure, irritable 

bowel syndrome, and has been shown to cause flare-ups of many other diseases.


     Anxiety is not a simple disorder to treat.  If the anxiety is due to recent

life stresses, you may simply need time to "work through" the changes.  If the 

anxiety is due to an underlying unhappiness with yourself or due to psychiatric 

disturbance, professional help will be needed.  Your physician can refer you for

further help if needed.


     Anti-anxiety medication is occasionally given if the stress is acute or if 

you are having trouble sleeping.  Chronic or frequent use of these medications 

is not a good idea because the body becomes reliant on it, preventing you from 

dealing with life's normal stresses.





Chest Wall Pain





   Your chest pain has been diagnosed as coming from the chest wall.  This is 

often caused by straining the muscles or joints in the chest during physical 

activity, direct trauma, coughing, or vigorous vomiting.  Persons with arthritis

are especially prone to this type of pain, due to inflammation of the cartilage 

joints near the breast bone.  Occasionally, no cause can be found.


   Rest from strenuous physical activity.  This kind of chest pain is usually 

made worse by movement of the chest.  Depending on the symptoms, we may 

prescribe medicine for pain, muscle relaxation, and antiinflammatory effects.


   If the pain is new, and seems to be due to muscle strain, cold packs can 

help. Otherwise, apply gentle warmth to the painful area for 15 minutes every 

hour or two.   


   You should contact the doctor immediately if things change.  Further 

evaluation is needed if you develop a fever or cough, if the nature of the pain 

changes, or if you become short of breath.





Palpitations (Irregular/Rapid Heartrate)





     Irregular or rapid heartbeat is called "palpitation."  To diagnose the 

cause of palpitation, we have to "catch it in the act" with an EKG.


     Sinus Tachycardia:  This is a rapid (but NORMAL) rhythm that can be due to 

fever, pain, anxiety, lack of sleep, over-exertion, or drugs. Cold medications, 

caffeine, and diet pills are particularly likely to cause tachycardia.  Usually,

all that's required is rest, reassurance, and avoiding caffeine, alcohol, 

nicotine, and unnecessary medicines.


     Paroxysmal Atrial Tachycardia (PAT):  This abnormally rapid heartbeat is 

caused by a "short circuit" in the electrical system of the heart.  It is not 

dangerous, unless other heart disease is present. These attacks of PAT may occur

occasionally for years.  Medication is available for treatment.


     Paroxysmal Atrial Fibrillation or Atrial Flutter:  This is irregular 

electrical activity in the upper heart chamber.  These abnormal rhythms often 

occur with valve disease or in hearts damaged by hardening of the arteries.  

These rhythms usually require further testing, for example a cardiac echo.


     Premature Beats:  Extra beats occur more commonly after caffeine, nicotine,

alcohol, cold pills, diet pills.  Emotional stress or fatigue also provoke them.

 Extra beats are only dangerous when heart disease is present.  They usually 

need no treatment.  If they're frequent, or if evidence of heart disease 

develops, medication can be given to suppress them.


     If we were unable to "catch" the palpitations on EKG, you should try to get

an EKG immediately if the symptoms begin again.  Contact the physician at once 

if you develop persistent lightheadedness, shortness of breath, chest pain, or 

swelling of the ankles.





***

********************************************************************************


**************************************








Take the medication as prescribed if you note that your heart is beating fast f

or any prolonged length of time.


Follow-up with your primary care provider to ask about a cardiology referral.


Otherwise, you can contact Dr. Pompa with [facility] at [phone].





RETURN TO THE EMERGENCY ROOM IF ANY NEW OR WORSENING SYMPTOMS.








Prescriptions: 


Propranolol HCl [Inderal 10 mg Tablet] 10 mg PO Q8 PRN #30 tab


 PRN Reason: 


Referrals: 


[facility] [Primary Care Provider] - Follow up as needed


SHIN PMOPA MD [ACTIVE STAFF] - Follow up as needed





I personally performed the services described in the documentation, reviewed and

edited the documentation which was dictated to the scribe in my presence, and it

accurately records my words and actions.

## 2021-01-02 ENCOUNTER — HOSPITAL ENCOUNTER (EMERGENCY)
Dept: HOSPITAL 62 - ER | Age: 51
Discharge: HOME | End: 2021-01-02
Payer: OTHER GOVERNMENT

## 2021-01-02 VITALS — SYSTOLIC BLOOD PRESSURE: 145 MMHG | DIASTOLIC BLOOD PRESSURE: 94 MMHG

## 2021-01-02 DIAGNOSIS — F17.200: ICD-10-CM

## 2021-01-02 DIAGNOSIS — F41.9: Primary | ICD-10-CM

## 2021-01-02 DIAGNOSIS — R07.89: ICD-10-CM

## 2021-01-02 DIAGNOSIS — I10: ICD-10-CM

## 2021-01-02 LAB
ADD MANUAL DIFF: NO
ALBUMIN SERPL-MCNC: 4 G/DL (ref 3.5–5)
ALP SERPL-CCNC: 86 U/L (ref 38–126)
ANION GAP SERPL CALC-SCNC: 12 MMOL/L (ref 5–19)
AST SERPL-CCNC: 21 U/L (ref 14–36)
BASOPHILS # BLD AUTO: 0.1 10^3/UL (ref 0–0.2)
BASOPHILS NFR BLD AUTO: 0.7 % (ref 0–2)
BILIRUB DIRECT SERPL-MCNC: 0.2 MG/DL (ref 0–0.4)
BILIRUB SERPL-MCNC: 0.4 MG/DL (ref 0.2–1.3)
BUN SERPL-MCNC: 8 MG/DL (ref 7–20)
CALCIUM: 9.3 MG/DL (ref 8.4–10.2)
CHLORIDE SERPL-SCNC: 108 MMOL/L (ref 98–107)
CK MB SERPL-MCNC: < 0.22 NG/ML (ref ?–4.55)
CK SERPL-CCNC: 22 U/L (ref 30–135)
CO2 SERPL-SCNC: 22 MMOL/L (ref 22–30)
EOSINOPHIL # BLD AUTO: 0.1 10^3/UL (ref 0–0.6)
EOSINOPHIL NFR BLD AUTO: 1.3 % (ref 0–6)
ERYTHROCYTE [DISTWIDTH] IN BLOOD BY AUTOMATED COUNT: 15.7 % (ref 11.5–14)
GLUCOSE SERPL-MCNC: 119 MG/DL (ref 75–110)
HCT VFR BLD CALC: 42.4 % (ref 36–47)
HGB BLD-MCNC: 14.9 G/DL (ref 12–15.5)
INR PPP: 1.01
LYMPHOCYTES # BLD AUTO: 3.1 10^3/UL (ref 0.5–4.7)
LYMPHOCYTES NFR BLD AUTO: 29.5 % (ref 13–45)
MCH RBC QN AUTO: 30.3 PG (ref 27–33.4)
MCHC RBC AUTO-ENTMCNC: 35.2 G/DL (ref 32–36)
MCV RBC AUTO: 86 FL (ref 80–97)
MONOCYTES # BLD AUTO: 1 10^3/UL (ref 0.1–1.4)
MONOCYTES NFR BLD AUTO: 9.1 % (ref 3–13)
NEUTROPHILS # BLD AUTO: 6.3 10^3/UL (ref 1.7–8.2)
NEUTS SEG NFR BLD AUTO: 59.4 % (ref 42–78)
NT PRO BNP: 45 PG/ML (ref ?–125)
PLATELET # BLD: 402 10^3/UL (ref 150–450)
POTASSIUM SERPL-SCNC: 3.9 MMOL/L (ref 3.6–5)
PROT SERPL-MCNC: 7.5 G/DL (ref 6.3–8.2)
PROTHROMBIN TIME: 13.5 SEC (ref 11.4–15.4)
RBC # BLD AUTO: 4.93 10^6/UL (ref 3.72–5.28)
TOTAL CELLS COUNTED % (AUTO): 100 %
TROPONIN I SERPL-MCNC: < 0.012 NG/ML
TROPONIN I SERPL-MCNC: < 0.012 NG/ML
WBC # BLD AUTO: 10.6 10^3/UL (ref 4–10.5)

## 2021-01-02 PROCEDURE — 82553 CREATINE MB FRACTION: CPT

## 2021-01-02 PROCEDURE — 93005 ELECTROCARDIOGRAM TRACING: CPT

## 2021-01-02 PROCEDURE — 93010 ELECTROCARDIOGRAM REPORT: CPT

## 2021-01-02 PROCEDURE — 80053 COMPREHEN METABOLIC PANEL: CPT

## 2021-01-02 PROCEDURE — 85610 PROTHROMBIN TIME: CPT

## 2021-01-02 PROCEDURE — 82550 ASSAY OF CK (CPK): CPT

## 2021-01-02 PROCEDURE — 83880 ASSAY OF NATRIURETIC PEPTIDE: CPT

## 2021-01-02 PROCEDURE — 71045 X-RAY EXAM CHEST 1 VIEW: CPT

## 2021-01-02 PROCEDURE — 36415 COLL VENOUS BLD VENIPUNCTURE: CPT

## 2021-01-02 PROCEDURE — 99285 EMERGENCY DEPT VISIT HI MDM: CPT

## 2021-01-02 PROCEDURE — 85379 FIBRIN DEGRADATION QUANT: CPT

## 2021-01-02 PROCEDURE — 85025 COMPLETE CBC W/AUTO DIFF WBC: CPT

## 2021-01-02 PROCEDURE — 84484 ASSAY OF TROPONIN QUANT: CPT

## 2021-01-02 NOTE — RADIOLOGY REPORT (SQ)
EXAM DESCRIPTION:

XR CHEST 1 VIEW



COMPLETED DATE/TME:  01/02/2021 03:44



CLINICAL HISTORY:

50 years Female, chest pain 



COMPARISON:

12/28/20.



NUMBER OF VIEWS/TECHNIQUE:

1/AP 



FINDINGS:



Adequate lung volume, clear parenchyma, normal cardiac

silhouette, and intact bony thorax.Old granulomatous disease. 

Upper abdominal clips.  



IMPRESSION:



No acute cardiopulmonary findings.

## 2021-01-02 NOTE — ER DOCUMENT REPORT
ED General





- General


Chief Complaint: Chest Pain


Stated Complaint: CHEST PAIN


Time Seen by Provider: 01/02/21 08:15


Primary Care Provider: 


MINOO MADDOX MD [COMMUNITY BASED STAFF] - Follow up as needed


SHIN POMPA MD [ACTIVE STAFF] - 01/04/21


[facility] [Primary Care [facility]] - 01/04/21


TRAVEL OUTSIDE OF THE [place] IN LAST 30 DAYS: No





- HPI


Notes: 





50-year-old female with a history of hypertension, UC, emphysema presents to the

emergency room today by ambulance for chest pain that started around 10 PM, 

dull, pounding on the left side without any radiation to her arm neck or back, 

reports she has mild headache, some shortness of breath, some nausea.  Reports 

chest pain started at rest, lasted for a couple minutes comes and goes.  Denies 

fevers, chills,palpitations,  shortness of breath, dyspnea, nausea, vomiting, 

diarrhea, abdominal pain, hematuria,blurred vision, double vision, loss of 

vision, speech changes, LH, dizziness, syncope, headaches, wheezing, ST, URI, ne

ck pain, weakness, bowel or bladder dysfunction, saddle anesthesia, numbness or 

tingling in bilateral upper or lower extremities equally, muscle paralysis, 

weakness in bilateral upper or lower extremities equally or rash. 





- Related Data


Allergies/Adverse Reactions: 


                                        





No Known Allergies Allergy (Verified 12/22/19 02:43)


   








Home Medications: Prednisone.  Sulfa salizine 500mg BID.  allanzipine 7.5mg BID.

 HCTZ 200mg.  AMbien 20mg.  Prozosone





Past Medical History





- General


Information source: Patient





- Social History


Smoking Status: Current Every Day Smoker


Chew tobacco use (# tins/day): No


Frequency of alcohol use: Occasional


Drug Abuse: None


Family History: Reviewed & Not Pertinent


Renal/ Medical History: Denies: Hx Peritoneal Dialysis


GI Medical History: Reports: Hx Ulcerative Colitis


Musculoskeletal Medical History: Reports Hx Arthritis - RA


Past Surgical History: Reports: Hx Abdominal Surgery - hernia repair, Hx 

Orthopedic Surgery - Right rotator cuff, Hx Umbilical Hernia





- Immunizations


Immunizations up to date: Yes


Hx Diphtheria, Pertussis, Tetanus Vaccination: Yes





Review of Systems





- Review of Systems


Constitutional: No symptoms reported


EENT: No symptoms reported


Cardiovascular: See HPI


Respiratory: No symptoms reported


Gastrointestinal: No symptoms reported


Genitourinary: No symptoms reported


Female Genitourinary: No symptoms reported


Musculoskeletal: No symptoms reported


Skin: No symptoms reported


Hematologic/Lymphatic: No symptoms reported


Neurological/Psychological: No symptoms reported





Physical Exam





- Vital signs


Vitals: 


                                        











Temp Pulse Resp BP Pulse Ox


 


 98.0 F   114 H  18   147/83 H  94 


 


 01/02/21 02:44  01/02/21 02:44  01/02/21 02:44  01/02/21 02:44  01/02/21 02:44














- Notes


Notes: 





MEDICATIONS: I agree with the patient medications as charted by the RN.





ALLERGIES: I agree with the allergies as charted by the RN.





PAST MEDICAL HISTORY/PAST SURGICAL HISTORY: Reviewed and agree as charted by RN.





SOCIAL HISTORY: Reviewed and agree as charted by RN.





FAMILY HISTORY: No significant familial comorbid conditions directly related to 

patient complaint





EXAM:


Reviewed vital signs as charted by RN.





PHYSICAL EXAMINATION: reviewed vital signs by RN





GENERAL: Well-appearing, well-nourished and in no acute distress.





HEAD: Atraumatic, normocephalic.





EYES: Pupils equal round and reactive to light, extraocular movements intact, 

conjunctiva are normal.





ENT: Nares patent, oropharynx clear without exudates.  Moist mucous membranes.





NECK: Normal range of motion, supple without lymphadenopathy





LUNGS: Breath sounds clear to auscultation bilaterally and equal.  No wheezes 

rales or rhonchi.





HEART: Regular rate and rhythm without murmurs





ABDOMEN: Soft, nontender, nondistended abdomen.  No guarding, no rebound.  No 

masses appreciated.





Female : deferred





Musculoskeletal: Normal range of motion, no pitting or edema.  No cyanosis.





NEUROLOGICAL: Cranial nerves grossly intact.  Normal speech, normal gait.  

Normal sensory, motor exams





PSYCH: Normal mood, normal affect.





SKIN: Warm, Dry, normal turgor, no rashes or lesions noted.








Course





- Re-evaluation


Re-evalutation: 





01/02/21 16:42


Afebrile, vital stable, no distress.  Nurses notes reviewed.  Patient's 

encounter 2 days ago reviewed when she came in for chest pain,  pertinent 

laboratory and diagnostic results evaluated.  CBC negative for leukocytosis or 

anemia, CMP negative for hepatic or renal dysfunction, D-dimer negative, patient

does not require CTA further evaluation for a PE, heart rate in the 70s when I 

pick this patient up at 8 AM/  patient's thyroid was slightly hypothyroidism 

free T4 was normal (this was done two days ago).   Chest x-ray unremarkable.  

EKG negative for acute STEMI.  I did obtain 3 separate troponins done 3 hours 

apart which were all negative due for patient returning back to the emergency 

room with chest pain.  Discussed with patient that she does need to follow-up 

with a cardiologist outpatient for further evaluation. Presentation of chest 

pain in an otherwise well appearing patient. Low clinical suspicion for ACS 

given clinical history, exam, EKG without ST elevations or depressions, and 

negative initial troponin. HEART score less than or equal to 3. PE also seems 

unlikely given clinical history, absence of tachycardia or dyspnea. Patient is 

PERC criteria negative. CXR without evidence of pneumothorax or pneumonia. No 

widened mediastinum. Aortic dissection also seems unlikely given history, 

symmetric pulses, CXR, and vitals. 





HEART Score:





History 0   


ECG 0      


Age+1      


Risk Factors   +2


Troponin   0





Total:3 





Chest pain in a patient without evidence of cardiac or other serious etiology on

workup today. I discussed with patient that, based on their age, risk factors 

and emergency department testing today, the likelihood that their symptoms are 

related to a heart attack is very low (estimated risk of heart attack or death 

over the next 30 days of less than 1%).  The patient demonstrates decision 

making capacity and has verbalized an understanding of these risks to me. Based 

on this,  the patient has chosen to follow-up as an outpatient. Usual chest pain

return precautions reviewed. The patient states understanding and agreement with

this plan.


01/02/21 16:49





01/02/21 16:51








- Vital Signs


Vital signs: 


                                        











Temp Pulse Resp BP Pulse Ox


 


 98.0 F   114 H  12   145/94 H  96 


 


 01/02/21 02:44  01/02/21 02:44  01/02/21 16:01  01/02/21 16:01  01/02/21 16:01














- Laboratory Results


Result Diagrams: 


                                 01/02/21 03:07





                                 01/02/21 03:07


Laboratory Results Interpreted: 


                                        











  01/02/21 01/02/21





  03:07 03:07


 


WBC  10.6 H 


 


RDW  15.7 H 


 


Chloride   108 H


 


Glucose   119 H


 


Creatine Kinase   22 L











Critical Laboratory Results Reviewed: No Critical Results





- Radiology Results


Critical Radiology Results Reviewed: No Critical Results





- EKG Interpretation by Me


EKG shows normal: Sinus rhythm


Rate: Normal


Additional EKG results interpreted by me: 





01/02/21 16:55


Heart rate 112, P axis 43, QRS axis 18, T axis 40.  No STEMI.  No ST segment 

elevations.  Interpreted by ER supervising physician





Discharge





- Discharge


Clinical Impression: 


 Anxiety, Chest wall pain, chest pain of unclear cause





Condition: Stable


Disposition: HOME, SELF-CARE


Instructions:  Chest Wall Pain ([facility]), Chest Pain of Unclear Cause ([facility])


Additional Instructions: 


We did 3 sets of troponins over a 12-hour, in the emergency room which all came 

back negative.  Your blood work was normal.  Your chest x-ray was normal as well

as your EKG.  It is advised that you follow-up with a cardiologist on Monday to 

make an appointment for further follow-up.  Please continue to take your 

antianxiety medication.  If you experience any worsening chest pain, shortness 

of breath, please return to the emergency room.  Return immediately for any new 

or worsening symptoms.





Follow up with primary care provider, call tomorrow to make followup 

appointment.


Referrals: 


MINOO MADDOX MD [COMMUNITY BASED STAFF] - Follow up as needed


SHIN POMPA MD [ACTIVE STAFF] - 01/04/21


[facility] [Primary Care Provider] - 01/04/21

## 2021-01-02 NOTE — EKG REPORT
SEVERITY:- OTHERWISE NORMAL ECG -

SINUS TACHYCARDIA

:

Confirmed by: Michael Hodges MD 02-Jan-2021 07:50:31

## 2022-11-17 NOTE — ER DOCUMENT REPORT
Entered by DEBBY DENIS SCRIBE  12/22/19 0332 





Acting as scribe for:MILTON ROLLE IV, MD





ED Substance Abuse / Acc. OD





- General


Mode of Arrival: Ambulatory


Information source: Patient


TRAVEL OUTSIDE OF THE [place] IN LAST 30 DAYS: No





<MILTON ROLLE IV - Last Filed: 12/22/19 05:36>





<TOY MARIE - Last Filed: 12/22/19 13:08>





- General


Chief Complaint: ETOH Abuse


Stated Complaint: ETOH


Time Seen by Provider: 12/22/19 02:44


Primary Care Provider: 


[facility] [[facility]] - Follow up as needed


Notes: 





This 49 year old female patient presents to the emergency department today with 

complaints of acute alcohol intoxication. Patient reported to nursing staff that

"[facility]. This is what I have got to do  to stay 

alive. [facility] what [facility] but I am not going to 

tell you guys here what I have going on". Denies HI/SI. (MILTON ROLLE IV)





- Related Data


Allergies/Adverse Reactions: 


                                        





No Known Allergies Allergy (Verified 12/22/19 02:43)


   











Past Medical History





- General


Information source: Patient





- Social History


Smoking Status: Current Every Day Smoker


Cigarette use (# per day): Yes


Frequency of alcohol use: Heavy


Drug Abuse: None


Family History: Reviewed & Not Pertinent


Patient has suicidal ideation:  - unk


Patient has homicidal ideation:  - unk


Renal/ Medical History: Denies: Hx Peritoneal Dialysis


Musculoskeletal Medical History: Reports Hx Arthritis - RA


Past Surgical History: Reports: Hx Abdominal Surgery - hernia repair, Hx 

Orthopedic Surgery - Right rotator cuff





<MILTON ROLLE IV - Last Filed: 12/22/19 05:36>





Review of Systems





- Review of Systems


-: Yes ROS unobtainable due to patient's medical condition - intoxicated





<MILTON ROLLE IV - Last Filed: 12/22/19 05:36>





Physical Exam





<MILTON ROLLE IV - Last Filed: 12/22/19 05:36>





- Vital signs


Vitals: 





                                        











Temp Pulse Resp BP Pulse Ox


 


 97.3 F   97   13   113/80   97 


 


 12/22/19 02:35  12/22/19 02:35  12/22/19 02:35  12/22/19 02:35  12/22/19 02:35














- Notes


Notes: 





Physical Exam:


 


General: Alert, sleeping. Intoxicated.


 


HEENT: Normocephalic. Atraumatic. PERRL. Extraocular movements intact. 

Oropharynx clear.


 


Neck: Supple. Non-tender.


 


Respiratory: No respiratory distress. Clear and equal breath sounds bilaterally.


 


Cardiovascular: Regular rate and rhythm. 


 


Abdominal: Normal Inspection. Non-tender. No distension. Normal Bowel Sounds. 


 


Back: No gross abnormalities. 


 


Extremities: Moves all four extremities.


Upper extremities: Normal inspection. Normal ROM.  


Lower extremities: Normal inspection. No edema. Normal ROM.


 


Skin: Warm. Dry. Normal color. (MILTON ROLLE IV)





Course





- Laboratory


Result Diagrams: 


                                 12/22/19 02:58





                                 12/22/19 02:58





<MILTON ROLLE IV - Last Filed: 12/22/19 05:36>





- Laboratory


Result Diagrams: 


                                 12/22/19 02:58





                                 12/22/19 02:58





<TOY MARIE - Last Filed: 12/22/19 13:08>





- Vital Signs


Vital signs: 





                                        











Temp Pulse Resp BP Pulse Ox


 


 98.2 F   97   19   121/78   100 


 


 12/22/19 11:00  12/22/19 02:35  12/22/19 11:16  12/22/19 11:16  12/22/19 11:16














- Laboratory


Laboratory results interpreted by me: 





                                        











  12/22/19





  02:58


 


Sodium  148.1 H


 


Chloride  113 H


 


Carbon Dioxide  20 L


 


AST  55 H














- EKG Interpretation by Me


Additional EKG results interpreted by me: 





12/22/19 03:32


EKG performed on 12/22/2019 at 0307 hrs. was interpreted by this MD.  Findings: 

Normal sinus rhythm, rate 86, normal axis, P waves preceding QRS complexes, QRS 

complexes appear narrow, there are no apparent ST segment elevation or depress

ion patterns to suggest acute myocardial injury or ischemia.  Impression: Normal

sinus rhythm with nonspecific ST segments. (MILTON ROLLE IV)





Discharge





<MILTON ROLLE IV - Last Filed: 12/22/19 05:36>





<TOY MARIE - Last Filed: 12/22/19 13:08>





- Discharge


Clinical Impression: 


 Alcohol use disorder





Condition: Stable


Disposition: HOME, SELF-CARE


Referrals: 


[facility] [[facility]] - Follow up as needed





I personally performed the services described in the documentation, reviewed and

edited the documentation which was dictated to the scribe in my presence, and it

accurately records my words and actions. annetta